# Patient Record
Sex: MALE | Race: WHITE | NOT HISPANIC OR LATINO
[De-identification: names, ages, dates, MRNs, and addresses within clinical notes are randomized per-mention and may not be internally consistent; named-entity substitution may affect disease eponyms.]

---

## 2017-01-23 ENCOUNTER — OTHER (OUTPATIENT)
Age: 72
End: 2017-01-23

## 2017-01-25 ENCOUNTER — APPOINTMENT (OUTPATIENT)
Dept: INTERNAL MEDICINE | Facility: CLINIC | Age: 72
End: 2017-01-25

## 2017-01-25 ENCOUNTER — LABORATORY RESULT (OUTPATIENT)
Age: 72
End: 2017-01-25

## 2017-01-29 LAB
25(OH)D3 SERPL-MCNC: 34.9 NG/ML
ALBUMIN SERPL ELPH-MCNC: 4 G/DL
ALP BLD-CCNC: 80 U/L
ALT SERPL-CCNC: 18 U/L
ANION GAP SERPL CALC-SCNC: 16 MMOL/L
AST SERPL-CCNC: 23 U/L
BASOPHILS # BLD AUTO: 0.03 K/UL
BASOPHILS NFR BLD AUTO: 0.3 %
BILIRUB SERPL-MCNC: 0.3 MG/DL
BUN SERPL-MCNC: 21 MG/DL
CALCIUM SERPL-MCNC: 10.1 MG/DL
CHLORIDE SERPL-SCNC: 101 MMOL/L
CHOLEST SERPL-MCNC: 104 MG/DL
CHOLEST/HDLC SERPL: 3.7 RATIO
CK SERPL-CCNC: 132 U/L
CO2 SERPL-SCNC: 26 MMOL/L
CREAT SERPL-MCNC: 1.47 MG/DL
EOSINOPHIL # BLD AUTO: 0.27 K/UL
EOSINOPHIL NFR BLD AUTO: 2.8 %
ERYTHROCYTE [SEDIMENTATION RATE] IN BLOOD BY WESTERGREN METHOD: 31 MM/HR
GGT SERPL-CCNC: 28 U/L
GLUCOSE SERPL-MCNC: 77 MG/DL
HBA1C MFR BLD HPLC: 6.4 %
HCT VFR BLD CALC: 44.3 %
HDLC SERPL-MCNC: 28 MG/DL
HGB BLD-MCNC: 15.3 G/DL
IMM GRANULOCYTES NFR BLD AUTO: 0.2 %
LDH SERPL-CCNC: 129 U/L
LDH1 CFR SERPL ELPH: 18 %
LDH1/LDH2 SERPL-CRTO: 0.53
LDH2 CFR SERPL ELPH: 34 %
LDH3 CFR SERPL ELPH: 22 %
LDH4 CFR SERPL ELPH: 9 %
LDH5 CFR SERPL ELPH: 17 %
LDLC SERPL CALC-MCNC: 45 MG/DL
LYMPHOCYTES # BLD AUTO: 2.19 K/UL
LYMPHOCYTES NFR BLD AUTO: 22.9 %
MAN DIFF?: NORMAL
MCHC RBC-ENTMCNC: 33.8 PG
MCHC RBC-ENTMCNC: 34.5 GM/DL
MCV RBC AUTO: 98 FL
MONOCYTES # BLD AUTO: 0.89 K/UL
MONOCYTES NFR BLD AUTO: 9.3 %
NEUTROPHILS # BLD AUTO: 6.16 K/UL
NEUTROPHILS NFR BLD AUTO: 64.5 %
PLATELET # BLD AUTO: 177 K/UL
POTASSIUM SERPL-SCNC: 3.6 MMOL/L
PROT SERPL-MCNC: 7.8 G/DL
PSA SERPL-MCNC: 0.57 NG/ML
RBC # BLD: 4.52 M/UL
RBC # FLD: 13.9 %
SAVE SPECIMEN: NORMAL
SODIUM SERPL-SCNC: 143 MMOL/L
T3RU NFR SERPL: 0.94 INDEX
T4 SERPL-MCNC: 6.2 UG/DL
TRIGL SERPL-MCNC: 154 MG/DL
TSH SERPL-ACNC: 3.37 UIU/ML
URATE SERPL-MCNC: 6.9 MG/DL
WBC # FLD AUTO: 9.56 K/UL

## 2017-02-08 ENCOUNTER — APPOINTMENT (OUTPATIENT)
Dept: INTERNAL MEDICINE | Facility: CLINIC | Age: 72
End: 2017-02-08

## 2017-02-08 ENCOUNTER — NON-APPOINTMENT (OUTPATIENT)
Age: 72
End: 2017-02-08

## 2017-02-08 VITALS — HEIGHT: 71 IN | WEIGHT: 194 LBS | BODY MASS INDEX: 27.16 KG/M2

## 2017-02-08 VITALS — DIASTOLIC BLOOD PRESSURE: 82 MMHG | SYSTOLIC BLOOD PRESSURE: 120 MMHG

## 2017-02-08 VITALS — SYSTOLIC BLOOD PRESSURE: 122 MMHG | DIASTOLIC BLOOD PRESSURE: 74 MMHG

## 2017-02-08 LAB
BILIRUB UR QL STRIP: NEGATIVE
CLARITY UR: CLEAR
COLLECTION METHOD: NORMAL
GLUCOSE UR-MCNC: NEGATIVE
HCG UR QL: 0.2 EU/DL
HGB UR QL STRIP.AUTO: NEGATIVE
KETONES UR-MCNC: NEGATIVE
LEUKOCYTE ESTERASE UR QL STRIP: NEGATIVE
NITRITE UR QL STRIP: NEGATIVE
PH UR STRIP: 7
PROT UR STRIP-MCNC: NEGATIVE
SP GR UR STRIP: 1.02

## 2017-05-31 ENCOUNTER — APPOINTMENT (OUTPATIENT)
Dept: NEUROLOGY | Facility: CLINIC | Age: 72
End: 2017-05-31

## 2017-05-31 VITALS
WEIGHT: 194 LBS | HEIGHT: 71 IN | BODY MASS INDEX: 27.16 KG/M2 | SYSTOLIC BLOOD PRESSURE: 160 MMHG | DIASTOLIC BLOOD PRESSURE: 81 MMHG | HEART RATE: 58 BPM

## 2017-06-07 ENCOUNTER — LABORATORY RESULT (OUTPATIENT)
Age: 72
End: 2017-06-07

## 2017-06-13 LAB
25(OH)D3 SERPL-MCNC: 35.2 NG/ML
ALBUMIN SERPL ELPH-MCNC: 4.4 G/DL
ALP BLD-CCNC: 83 U/L
ALT SERPL-CCNC: 14 U/L
ANION GAP SERPL CALC-SCNC: 11 MMOL/L
AST SERPL-CCNC: 21 U/L
BASOPHILS # BLD AUTO: 0.03 K/UL
BASOPHILS NFR BLD AUTO: 0.3 %
BILIRUB SERPL-MCNC: 0.5 MG/DL
BUN SERPL-MCNC: 21 MG/DL
CALCIUM SERPL-MCNC: 10.3 MG/DL
CHLORIDE SERPL-SCNC: 101 MMOL/L
CHOLEST SERPL-MCNC: 106 MG/DL
CHOLEST/HDLC SERPL: 3.1 RATIO
CK SERPL-CCNC: 134 U/L
CO2 SERPL-SCNC: 25 MMOL/L
CREAT SERPL-MCNC: 1.42 MG/DL
EOSINOPHIL # BLD AUTO: 0.21 K/UL
EOSINOPHIL NFR BLD AUTO: 1.8 %
ERYTHROCYTE [SEDIMENTATION RATE] IN BLOOD BY WESTERGREN METHOD: 37 MM/HR
FOLATE SERPL-MCNC: >20 NG/ML
GGT SERPL-CCNC: 33 U/L
GLUCOSE BS SERPL-MCNC: 96 MG/DL
HCT VFR BLD CALC: 43.5 %
HDLC SERPL-MCNC: 34 MG/DL
HGB BLD-MCNC: 15.1 G/DL
IMM GRANULOCYTES NFR BLD AUTO: 0.3 %
LDLC SERPL CALC-MCNC: 51 MG/DL
LYMPHOCYTES # BLD AUTO: 1.51 K/UL
LYMPHOCYTES NFR BLD AUTO: 13.1 %
MAN DIFF?: NORMAL
MCHC RBC-ENTMCNC: 33.7 PG
MCHC RBC-ENTMCNC: 34.7 GM/DL
MCV RBC AUTO: 97.1 FL
MONOCYTES # BLD AUTO: 1 K/UL
MONOCYTES NFR BLD AUTO: 8.7 %
NEUTROPHILS # BLD AUTO: 8.77 K/UL
NEUTROPHILS NFR BLD AUTO: 75.8 %
PANTOTHENATE SERPLBLD-MCNC: 41.2 NG/ML
PLATELET # BLD AUTO: 155 K/UL
POTASSIUM SERPL-SCNC: 3.6 MMOL/L
PROT SERPL-MCNC: 8.2 G/DL
RBC # BLD: 4.48 M/UL
RBC # FLD: 14.5 %
RPR SER QL: NORMAL
SODIUM SERPL-SCNC: 137 MMOL/L
T3RU NFR SERPL: 0.95 INDEX
T4 SERPL-MCNC: 7.2 UG/DL
TRIGL SERPL-MCNC: 106 MG/DL
TSH SERPL-ACNC: 2.25 UIU/ML
URATE SERPL-MCNC: 6.7 MG/DL
VIT B12 SERPL-MCNC: 665 PG/ML
VIT B2 SERPL-MCNC: 178 UG/L
VIT B6 SERPL-MCNC: 14.4 UG/L
WBC # FLD AUTO: 11.55 K/UL

## 2017-06-21 ENCOUNTER — MEDICATION RENEWAL (OUTPATIENT)
Age: 72
End: 2017-06-21

## 2017-06-21 ENCOUNTER — APPOINTMENT (OUTPATIENT)
Dept: INTERNAL MEDICINE | Facility: CLINIC | Age: 72
End: 2017-06-21

## 2017-06-21 ENCOUNTER — NON-APPOINTMENT (OUTPATIENT)
Age: 72
End: 2017-06-21

## 2017-06-21 VITALS — DIASTOLIC BLOOD PRESSURE: 72 MMHG | SYSTOLIC BLOOD PRESSURE: 120 MMHG

## 2017-06-21 VITALS — WEIGHT: 196 LBS | HEIGHT: 71 IN | BODY MASS INDEX: 27.44 KG/M2

## 2017-06-21 DIAGNOSIS — R05 COUGH: ICD-10-CM

## 2017-06-21 LAB
BASOPHILS # BLD AUTO: 0.03 K/UL
BASOPHILS NFR BLD AUTO: 0.2 %
EOSINOPHIL # BLD AUTO: 0.24 K/UL
EOSINOPHIL NFR BLD AUTO: 1.7 %
HCT VFR BLD CALC: 42 %
HGB BLD-MCNC: 14.4 G/DL
IMM GRANULOCYTES NFR BLD AUTO: 0.3 %
LYMPHOCYTES # BLD AUTO: 2.06 K/UL
LYMPHOCYTES NFR BLD AUTO: 14.2 %
MAN DIFF?: NORMAL
MCHC RBC-ENTMCNC: 32.7 PG
MCHC RBC-ENTMCNC: 34.3 GM/DL
MCV RBC AUTO: 95.5 FL
MONOCYTES # BLD AUTO: 1.03 K/UL
MONOCYTES NFR BLD AUTO: 7.1 %
NEUTROPHILS # BLD AUTO: 11.08 K/UL
NEUTROPHILS NFR BLD AUTO: 76.5 %
PLATELET # BLD AUTO: 221 K/UL
RBC # BLD: 4.4 M/UL
RBC # FLD: 14.3 %
WBC # FLD AUTO: 14.49 K/UL

## 2017-07-05 ENCOUNTER — APPOINTMENT (OUTPATIENT)
Dept: INTERNAL MEDICINE | Facility: CLINIC | Age: 72
End: 2017-07-05

## 2017-07-05 ENCOUNTER — LABORATORY RESULT (OUTPATIENT)
Age: 72
End: 2017-07-05

## 2017-07-06 LAB
ALBUMIN SERPL ELPH-MCNC: 4 G/DL
ALP BLD-CCNC: 77 U/L
ALT SERPL-CCNC: 13 U/L
ANION GAP SERPL CALC-SCNC: 14 MMOL/L
AST SERPL-CCNC: 16 U/L
BASOPHILS # BLD AUTO: 0.02 K/UL
BASOPHILS NFR BLD AUTO: 0.3 %
BILIRUB SERPL-MCNC: 0.4 MG/DL
BUN SERPL-MCNC: 30 MG/DL
CALCIUM SERPL-MCNC: 10.2 MG/DL
CHLORIDE SERPL-SCNC: 98 MMOL/L
CO2 SERPL-SCNC: 27 MMOL/L
CREAT SERPL-MCNC: 1.72 MG/DL
EOSINOPHIL # BLD AUTO: 0.2 K/UL
EOSINOPHIL NFR BLD AUTO: 2.5 %
GGT SERPL-CCNC: 25 U/L
GLUCOSE SERPL-MCNC: 115 MG/DL
HCT VFR BLD CALC: 42.6 %
HGB BLD-MCNC: 14.7 G/DL
IMM GRANULOCYTES NFR BLD AUTO: 0.4 %
LYMPHOCYTES # BLD AUTO: 1.7 K/UL
LYMPHOCYTES NFR BLD AUTO: 21.4 %
MAN DIFF?: NORMAL
MCHC RBC-ENTMCNC: 33.6 PG
MCHC RBC-ENTMCNC: 34.5 GM/DL
MCV RBC AUTO: 97.5 FL
MONOCYTES # BLD AUTO: 0.8 K/UL
MONOCYTES NFR BLD AUTO: 10.1 %
NEUTROPHILS # BLD AUTO: 5.19 K/UL
NEUTROPHILS NFR BLD AUTO: 65.3 %
PLATELET # BLD AUTO: 176 K/UL
POTASSIUM SERPL-SCNC: 4.2 MMOL/L
PROT SERPL-MCNC: 7.5 G/DL
RBC # BLD: 4.37 M/UL
RBC # FLD: 14.5 %
SODIUM SERPL-SCNC: 139 MMOL/L
WBC # FLD AUTO: 7.94 K/UL

## 2017-07-19 ENCOUNTER — LABORATORY RESULT (OUTPATIENT)
Age: 72
End: 2017-07-19

## 2017-07-19 ENCOUNTER — APPOINTMENT (OUTPATIENT)
Dept: INTERNAL MEDICINE | Facility: CLINIC | Age: 72
End: 2017-07-19

## 2017-09-27 ENCOUNTER — LABORATORY RESULT (OUTPATIENT)
Age: 72
End: 2017-09-27

## 2017-09-27 ENCOUNTER — APPOINTMENT (OUTPATIENT)
Dept: INTERNAL MEDICINE | Facility: CLINIC | Age: 72
End: 2017-09-27
Payer: MEDICARE

## 2017-09-27 PROCEDURE — 36415 COLL VENOUS BLD VENIPUNCTURE: CPT

## 2017-09-28 LAB
25(OH)D3 SERPL-MCNC: 33.7 NG/ML
ALBUMIN SERPL ELPH-MCNC: 4 G/DL
ALP BLD-CCNC: 70 U/L
ALT SERPL-CCNC: 14 U/L
ANION GAP SERPL CALC-SCNC: 14 MMOL/L
AST SERPL-CCNC: 21 U/L
BASOPHILS # BLD AUTO: 0.03 K/UL
BASOPHILS NFR BLD AUTO: 0.3 %
BILIRUB SERPL-MCNC: 0.5 MG/DL
BUN SERPL-MCNC: 20 MG/DL
CALCIUM SERPL-MCNC: 9.7 MG/DL
CHLORIDE SERPL-SCNC: 100 MMOL/L
CHOLEST SERPL-MCNC: 105 MG/DL
CHOLEST/HDLC SERPL: 3.8 RATIO
CK SERPL-CCNC: 156 U/L
CO2 SERPL-SCNC: 25 MMOL/L
CREAT SERPL-MCNC: 1.36 MG/DL
EOSINOPHIL # BLD AUTO: 0.27 K/UL
EOSINOPHIL NFR BLD AUTO: 3 %
GGT SERPL-CCNC: 28 U/L
GLUCOSE SERPL-MCNC: 89 MG/DL
HBA1C MFR BLD HPLC: 6 %
HCT VFR BLD CALC: 42.5 %
HDLC SERPL-MCNC: 28 MG/DL
HGB BLD-MCNC: 14.7 G/DL
IMM GRANULOCYTES NFR BLD AUTO: 0.3 %
LDLC SERPL CALC-MCNC: 44 MG/DL
LYMPHOCYTES # BLD AUTO: 2.42 K/UL
LYMPHOCYTES NFR BLD AUTO: 27.2 %
MAN DIFF?: NORMAL
MCHC RBC-ENTMCNC: 33.9 PG
MCHC RBC-ENTMCNC: 34.6 GM/DL
MCV RBC AUTO: 98.2 FL
MONOCYTES # BLD AUTO: 0.8 K/UL
MONOCYTES NFR BLD AUTO: 9 %
NEUTROPHILS # BLD AUTO: 5.35 K/UL
NEUTROPHILS NFR BLD AUTO: 60.2 %
PLATELET # BLD AUTO: 170 K/UL
POTASSIUM SERPL-SCNC: 3.6 MMOL/L
PROT SERPL-MCNC: 7.3 G/DL
PSA FREE FLD-MCNC: 42.9
PSA FREE SERPL-MCNC: 0.24 NG/ML
PSA SERPL-MCNC: 0.56 NG/ML
RBC # BLD: 4.33 M/UL
RBC # FLD: 14.9 %
SAVE SPECIMEN: NORMAL
SODIUM SERPL-SCNC: 139 MMOL/L
T3RU NFR SERPL: 0.95 INDEX
T4 SERPL-MCNC: 6.5 UG/DL
TRIGL SERPL-MCNC: 165 MG/DL
TSH SERPL-ACNC: 2.51 UIU/ML
URATE SERPL-MCNC: 7.1 MG/DL
WBC # FLD AUTO: 8.9 K/UL

## 2017-10-02 ENCOUNTER — NON-APPOINTMENT (OUTPATIENT)
Age: 72
End: 2017-10-02

## 2017-10-02 ENCOUNTER — APPOINTMENT (OUTPATIENT)
Dept: INTERNAL MEDICINE | Facility: CLINIC | Age: 72
End: 2017-10-02
Payer: MEDICARE

## 2017-10-02 VITALS — HEIGHT: 71 IN | BODY MASS INDEX: 44.1 KG/M2 | WEIGHT: 315 LBS

## 2017-10-02 VITALS — SYSTOLIC BLOOD PRESSURE: 138 MMHG | DIASTOLIC BLOOD PRESSURE: 80 MMHG

## 2017-10-02 VITALS — DIASTOLIC BLOOD PRESSURE: 86 MMHG | SYSTOLIC BLOOD PRESSURE: 142 MMHG

## 2017-10-02 LAB
BILIRUB UR QL STRIP: NORMAL
CLARITY UR: CLEAR
COLLECTION METHOD: NORMAL
GLUCOSE UR-MCNC: NORMAL
HCG UR QL: 0.2 EU/DL
HGB UR QL STRIP.AUTO: NORMAL
KETONES UR-MCNC: NORMAL
LEUKOCYTE ESTERASE UR QL STRIP: NORMAL
NITRITE UR QL STRIP: NORMAL
PH UR STRIP: 6.5
PROT UR STRIP-MCNC: NORMAL
SP GR UR STRIP: 1.02

## 2017-10-02 PROCEDURE — 90686 IIV4 VACC NO PRSV 0.5 ML IM: CPT

## 2017-10-02 PROCEDURE — 99214 OFFICE O/P EST MOD 30 MIN: CPT | Mod: 25

## 2017-10-02 PROCEDURE — G0008: CPT

## 2017-10-02 PROCEDURE — 93000 ELECTROCARDIOGRAM COMPLETE: CPT

## 2017-10-02 PROCEDURE — 81003 URINALYSIS AUTO W/O SCOPE: CPT | Mod: QW

## 2018-01-03 ENCOUNTER — LABORATORY RESULT (OUTPATIENT)
Age: 73
End: 2018-01-03

## 2018-01-03 ENCOUNTER — NON-APPOINTMENT (OUTPATIENT)
Age: 73
End: 2018-01-03

## 2018-01-03 ENCOUNTER — APPOINTMENT (OUTPATIENT)
Dept: INTERNAL MEDICINE | Facility: CLINIC | Age: 73
End: 2018-01-03
Payer: MEDICARE

## 2018-01-03 VITALS — HEIGHT: 71 IN | WEIGHT: 195 LBS | BODY MASS INDEX: 27.3 KG/M2

## 2018-01-03 VITALS — DIASTOLIC BLOOD PRESSURE: 82 MMHG | SYSTOLIC BLOOD PRESSURE: 138 MMHG

## 2018-01-03 PROCEDURE — 36415 COLL VENOUS BLD VENIPUNCTURE: CPT

## 2018-01-03 PROCEDURE — 81003 URINALYSIS AUTO W/O SCOPE: CPT | Mod: QW

## 2018-01-03 PROCEDURE — 93000 ELECTROCARDIOGRAM COMPLETE: CPT

## 2018-01-03 PROCEDURE — 99214 OFFICE O/P EST MOD 30 MIN: CPT | Mod: 25

## 2018-01-03 RX ORDER — VENLAFAXINE HYDROCHLORIDE 75 MG/1
75 CAPSULE, EXTENDED RELEASE ORAL DAILY
Qty: 90 | Refills: 3 | Status: DISCONTINUED | COMMUNITY
Start: 2017-10-02 | End: 2018-01-03

## 2018-01-04 LAB
25(OH)D3 SERPL-MCNC: 26.7 NG/ML
ALBUMIN SERPL ELPH-MCNC: 4 G/DL
ALP BLD-CCNC: 77 U/L
ALT SERPL-CCNC: 18 U/L
ANION GAP SERPL CALC-SCNC: 12 MMOL/L
AST SERPL-CCNC: 24 U/L
BASOPHILS # BLD AUTO: 0.05 K/UL
BASOPHILS NFR BLD AUTO: 0.5 %
BILIRUB SERPL-MCNC: 0.4 MG/DL
BUN SERPL-MCNC: 22 MG/DL
CALCIUM SERPL-MCNC: 10.2 MG/DL
CHLORIDE SERPL-SCNC: 98 MMOL/L
CHOLEST SERPL-MCNC: 107 MG/DL
CHOLEST/HDLC SERPL: 4 RATIO
CK SERPL-CCNC: 129 U/L
CO2 SERPL-SCNC: 28 MMOL/L
CREAT SERPL-MCNC: 1.5 MG/DL
EOSINOPHIL # BLD AUTO: 0.32 K/UL
EOSINOPHIL NFR BLD AUTO: 3.1 %
GGT SERPL-CCNC: 30 U/L
GLUCOSE SERPL-MCNC: 65 MG/DL
HBA1C MFR BLD HPLC: 6.2 %
HCT VFR BLD CALC: 43.8 %
HDLC SERPL-MCNC: 27 MG/DL
HGB BLD-MCNC: 15 G/DL
IMM GRANULOCYTES NFR BLD AUTO: 0.3 %
LDLC SERPL CALC-MCNC: 42 MG/DL
LYMPHOCYTES # BLD AUTO: 3.22 K/UL
LYMPHOCYTES NFR BLD AUTO: 30.8 %
MAN DIFF?: NORMAL
MCHC RBC-ENTMCNC: 33.8 PG
MCHC RBC-ENTMCNC: 34.2 GM/DL
MCV RBC AUTO: 98.6 FL
MONOCYTES # BLD AUTO: 0.88 K/UL
MONOCYTES NFR BLD AUTO: 8.4 %
NEUTROPHILS # BLD AUTO: 5.96 K/UL
NEUTROPHILS NFR BLD AUTO: 56.9 %
PLATELET # BLD AUTO: 161 K/UL
POTASSIUM SERPL-SCNC: 3.8 MMOL/L
PROT SERPL-MCNC: 7.5 G/DL
PSA FREE FLD-MCNC: 47.5
PSA FREE SERPL-MCNC: 0.28 NG/ML
PSA SERPL-MCNC: 0.59 NG/ML
RBC # BLD: 4.44 M/UL
RBC # FLD: 14.9 %
SAVE SPECIMEN: NORMAL
SODIUM SERPL-SCNC: 138 MMOL/L
T3RU NFR SERPL: 0.99 INDEX
T4 SERPL-MCNC: 6.2 UG/DL
TRIGL SERPL-MCNC: 190 MG/DL
TSH SERPL-ACNC: 3.21 UIU/ML
URATE SERPL-MCNC: 6.7 MG/DL
WBC # FLD AUTO: 10.46 K/UL

## 2018-01-05 LAB
HCV AB SER QL: NONREACTIVE
HCV S/CO RATIO: 0.19 S/CO

## 2018-01-16 ENCOUNTER — MESSAGE (OUTPATIENT)
Age: 73
End: 2018-01-16

## 2018-01-16 ENCOUNTER — APPOINTMENT (OUTPATIENT)
Dept: GASTROENTEROLOGY | Facility: CLINIC | Age: 73
End: 2018-01-16
Payer: MEDICARE

## 2018-01-16 VITALS
WEIGHT: 198 LBS | SYSTOLIC BLOOD PRESSURE: 136 MMHG | DIASTOLIC BLOOD PRESSURE: 80 MMHG | OXYGEN SATURATION: 97 % | HEART RATE: 58 BPM | BODY MASS INDEX: 27.62 KG/M2

## 2018-01-16 PROCEDURE — 99203 OFFICE O/P NEW LOW 30 MIN: CPT

## 2018-01-16 RX ORDER — SODIUM PICOSULFATE, MAGNESIUM OXIDE, AND ANHYDROUS CITRIC ACID 10; 3.5; 12 MG/16.2G; G/16.2G; G/16.2G
10-3.5-12 POWDER, METERED ORAL
Qty: 1 | Refills: 0 | Status: DISCONTINUED | COMMUNITY
Start: 2018-01-16 | End: 2018-01-16

## 2018-03-29 ENCOUNTER — APPOINTMENT (OUTPATIENT)
Dept: GASTROENTEROLOGY | Facility: HOSPITAL | Age: 73
End: 2018-03-29

## 2018-04-05 ENCOUNTER — APPOINTMENT (OUTPATIENT)
Dept: GASTROENTEROLOGY | Facility: HOSPITAL | Age: 73
End: 2018-04-05

## 2018-04-05 ENCOUNTER — RESULT REVIEW (OUTPATIENT)
Age: 73
End: 2018-04-05

## 2018-04-05 ENCOUNTER — OUTPATIENT (OUTPATIENT)
Dept: OUTPATIENT SERVICES | Facility: HOSPITAL | Age: 73
LOS: 1 days | Discharge: HOME | End: 2018-04-05
Payer: MEDICARE

## 2018-04-05 DIAGNOSIS — Z12.11 ENCOUNTER FOR SCREENING FOR MALIGNANT NEOPLASM OF COLON: ICD-10-CM

## 2018-04-05 PROCEDURE — 45380 COLONOSCOPY AND BIOPSY: CPT | Mod: 59,PT

## 2018-04-05 PROCEDURE — 88305 TISSUE EXAM BY PATHOLOGIST: CPT | Mod: 26

## 2018-04-05 PROCEDURE — 45385 COLONOSCOPY W/LESION REMOVAL: CPT | Mod: PT

## 2018-04-09 LAB — SURGICAL PATHOLOGY STUDY: SIGNIFICANT CHANGE UP

## 2018-05-30 ENCOUNTER — LABORATORY RESULT (OUTPATIENT)
Age: 73
End: 2018-05-30

## 2018-05-30 ENCOUNTER — NON-APPOINTMENT (OUTPATIENT)
Age: 73
End: 2018-05-30

## 2018-05-30 ENCOUNTER — APPOINTMENT (OUTPATIENT)
Dept: INTERNAL MEDICINE | Facility: CLINIC | Age: 73
End: 2018-05-30
Payer: MEDICARE

## 2018-05-30 VITALS
SYSTOLIC BLOOD PRESSURE: 128 MMHG | HEIGHT: 70.5 IN | BODY MASS INDEX: 27.75 KG/M2 | WEIGHT: 196 LBS | DIASTOLIC BLOOD PRESSURE: 84 MMHG

## 2018-05-30 VITALS — SYSTOLIC BLOOD PRESSURE: 130 MMHG | DIASTOLIC BLOOD PRESSURE: 86 MMHG

## 2018-05-30 LAB
BILIRUB UR QL STRIP: NORMAL
CLARITY UR: CLEAR
COLLECTION METHOD: NORMAL
GLUCOSE UR-MCNC: NORMAL
HCG UR QL: 0.2 EU/DL
HGB UR QL STRIP.AUTO: NORMAL
KETONES UR-MCNC: NORMAL
LEUKOCYTE ESTERASE UR QL STRIP: NORMAL
NITRITE UR QL STRIP: NORMAL
PH UR STRIP: 6
PROT UR STRIP-MCNC: NORMAL
SAVE SPECIMEN: NORMAL
SP GR UR STRIP: 1.02

## 2018-05-30 PROCEDURE — 81003 URINALYSIS AUTO W/O SCOPE: CPT | Mod: QW

## 2018-05-30 PROCEDURE — 99214 OFFICE O/P EST MOD 30 MIN: CPT

## 2018-05-30 PROCEDURE — 93000 ELECTROCARDIOGRAM COMPLETE: CPT

## 2018-05-30 RX ORDER — SODIUM SULFATE, POTASSIUM SULFATE, MAGNESIUM SULFATE 17.5; 3.13; 1.6 G/ML; G/ML; G/ML
17.5-3.13-1.6 SOLUTION, CONCENTRATE ORAL
Qty: 1 | Refills: 0 | Status: DISCONTINUED | COMMUNITY
Start: 2018-01-16 | End: 2018-05-30

## 2018-05-30 NOTE — ASSESSMENT
[FreeTextEntry1] : This is a complex 72-year-old male whose history has been reviewed above\par \par He has a history of hypertension his blood pressure is under excellent control no medication changes\par \par His history of recurrent fibrillation he has had no recurrent episodes he is followed by cardiology EKG is unchanged\par \par He has a history of ASHD and hypercholesterolemia and is on a statin cholesterol" medication changes particularly results\par \par He continues to demonstrate somnolence he fell asleep twice in my office. He is quite belligerent when I pointed this out and has refused a sleep study.\par \par He does have some elements of mild memory impairment and he continues to be belligerent. I did prescribe Lexapro which he refused however his wife has been giving him 5 mg pills. I told her that I cannot prescribe medication for him but he does not know about. I told her that she must tell him what she is doing. At this point I cannot avulsion to him without breaking the confidentiality of her patient-doctor relationship but that I would not continue to prescribe it if he was not aware

## 2018-05-30 NOTE — HISTORY OF PRESENT ILLNESS
[FreeTextEntry1] : This is a 73-year-old male for evaluation and treatment of his hypertension hypercholesterolemia history of atrial fibrillation ASHD and gout [de-identified] : Patient states that he is feeling well he has had no chest pain shortness of breath. He has had no episodes of atrial fibrillation.\par \par He is complaining of some mild upper respiratory tract symptoms

## 2018-05-30 NOTE — PHYSICAL EXAM
[No Acute Distress] : no acute distress [Well Nourished] : well nourished [Well Developed] : well developed [Normal Sclera/Conjunctiva] : normal sclera/conjunctiva [PERRL] : pupils equal round and reactive to light [No JVD] : no jugular venous distention [No Respiratory Distress] : no respiratory distress  [Normal Rate] : normal rate  [Regular Rhythm] : with a regular rhythm [Normal S1, S2] : normal S1 and S2 [No Edema] : there was no peripheral edema [No Joint Swelling] : no joint swelling [No Focal Deficits] : no focal deficits [de-identified] : Occasional rales that clear with cough [de-identified] : Lethargic [de-identified] : Basically unchanged

## 2018-05-31 LAB
25(OH)D3 SERPL-MCNC: 30.6 NG/ML
ALBUMIN SERPL ELPH-MCNC: 4.1 G/DL
ALP BLD-CCNC: 70 U/L
ALT SERPL-CCNC: 11 U/L
ANION GAP SERPL CALC-SCNC: 13 MMOL/L
AST SERPL-CCNC: 16 U/L
BASOPHILS # BLD AUTO: 0.02 K/UL
BASOPHILS NFR BLD AUTO: 0.2 %
BILIRUB SERPL-MCNC: 0.6 MG/DL
BUN SERPL-MCNC: 24 MG/DL
CALCIUM SERPL-MCNC: 10 MG/DL
CHLORIDE SERPL-SCNC: 98 MMOL/L
CHOLEST SERPL-MCNC: 101 MG/DL
CHOLEST/HDLC SERPL: 3.4 RATIO
CK SERPL-CCNC: 113 U/L
CO2 SERPL-SCNC: 25 MMOL/L
CREAT SERPL-MCNC: 1.49 MG/DL
CRP SERPL HS-MCNC: 1.4 MG/L
EOSINOPHIL # BLD AUTO: 0.26 K/UL
EOSINOPHIL NFR BLD AUTO: 2.4 %
GGT SERPL-CCNC: 32 U/L
GLUCOSE SERPL-MCNC: 87 MG/DL
HBA1C MFR BLD HPLC: 6.1 %
HCT VFR BLD CALC: 45 %
HDLC SERPL-MCNC: 30 MG/DL
HGB BLD-MCNC: 15.1 G/DL
IMM GRANULOCYTES NFR BLD AUTO: 0.4 %
LDLC SERPL CALC-MCNC: 44 MG/DL
LYMPHOCYTES # BLD AUTO: 2.74 K/UL
LYMPHOCYTES NFR BLD AUTO: 24.8 %
MAN DIFF?: NORMAL
MCHC RBC-ENTMCNC: 32.9 PG
MCHC RBC-ENTMCNC: 33.6 GM/DL
MCV RBC AUTO: 98 FL
MONOCYTES # BLD AUTO: 0.86 K/UL
MONOCYTES NFR BLD AUTO: 7.8 %
NEUTROPHILS # BLD AUTO: 7.12 K/UL
NEUTROPHILS NFR BLD AUTO: 64.4 %
PLATELET # BLD AUTO: 172 K/UL
POTASSIUM SERPL-SCNC: 3.7 MMOL/L
PROT SERPL-MCNC: 7.6 G/DL
RBC # BLD: 4.59 M/UL
RBC # FLD: 15 %
SODIUM SERPL-SCNC: 136 MMOL/L
T3RU NFR SERPL: 0.89 INDEX
T4 SERPL-MCNC: 7.2 UG/DL
TRIGL SERPL-MCNC: 135 MG/DL
TSH SERPL-ACNC: 1.87 UIU/ML
URATE SERPL-MCNC: 6.6 MG/DL
WBC # FLD AUTO: 11.04 K/UL

## 2018-08-03 ENCOUNTER — MESSAGE (OUTPATIENT)
Age: 73
End: 2018-08-03

## 2018-08-05 ENCOUNTER — RESULT CHARGE (OUTPATIENT)
Age: 73
End: 2018-08-05

## 2018-08-06 ENCOUNTER — NON-APPOINTMENT (OUTPATIENT)
Age: 73
End: 2018-08-06

## 2018-08-06 ENCOUNTER — APPOINTMENT (OUTPATIENT)
Dept: INTERNAL MEDICINE | Facility: CLINIC | Age: 73
End: 2018-08-06
Payer: MEDICARE

## 2018-08-06 VITALS — DIASTOLIC BLOOD PRESSURE: 70 MMHG | SYSTOLIC BLOOD PRESSURE: 130 MMHG

## 2018-08-06 DIAGNOSIS — M79.604 PAIN IN RIGHT LEG: ICD-10-CM

## 2018-08-06 DIAGNOSIS — M54.31 SCIATICA, RIGHT SIDE: ICD-10-CM

## 2018-08-06 LAB
ALBUMIN SERPL ELPH-MCNC: 4.1 G/DL
ALP BLD-CCNC: 64 U/L
ALT SERPL-CCNC: 16 U/L
ANION GAP SERPL CALC-SCNC: 14 MMOL/L
AST SERPL-CCNC: 21 U/L
BASOPHILS # BLD AUTO: 0.02 K/UL
BASOPHILS NFR BLD AUTO: 0.2 %
BILIRUB SERPL-MCNC: 0.5 MG/DL
BILIRUB UR QL STRIP: NORMAL
BUN SERPL-MCNC: 22 MG/DL
CALCIUM SERPL-MCNC: 10.5 MG/DL
CHLORIDE SERPL-SCNC: 97 MMOL/L
CHOLEST SERPL-MCNC: 100 MG/DL
CHOLEST/HDLC SERPL: 2.9 RATIO
CK SERPL-CCNC: 133 U/L
CLARITY UR: CLEAR
CO2 SERPL-SCNC: 28 MMOL/L
COLLECTION METHOD: NORMAL
CREAT SERPL-MCNC: 1.5 MG/DL
EOSINOPHIL # BLD AUTO: 0.33 K/UL
EOSINOPHIL NFR BLD AUTO: 3 %
GGT SERPL-CCNC: 26 U/L
GLUCOSE SERPL-MCNC: 92 MG/DL
GLUCOSE UR-MCNC: NORMAL
HBA1C MFR BLD HPLC: 6.2 %
HCG UR QL: 0.2 EU/DL
HCT VFR BLD CALC: 44.5 %
HDLC SERPL-MCNC: 35 MG/DL
HGB BLD-MCNC: 15.6 G/DL
HGB UR QL STRIP.AUTO: NORMAL
IMM GRANULOCYTES NFR BLD AUTO: 0.4 %
KETONES UR-MCNC: NORMAL
LDLC SERPL CALC-MCNC: 44 MG/DL
LEUKOCYTE ESTERASE UR QL STRIP: NORMAL
LYMPHOCYTES # BLD AUTO: 2.18 K/UL
LYMPHOCYTES NFR BLD AUTO: 19.6 %
MAN DIFF?: NORMAL
MCHC RBC-ENTMCNC: 34 PG
MCHC RBC-ENTMCNC: 35.1 GM/DL
MCV RBC AUTO: 96.9 FL
MONOCYTES # BLD AUTO: 1.01 K/UL
MONOCYTES NFR BLD AUTO: 9.1 %
NEUTROPHILS # BLD AUTO: 7.52 K/UL
NEUTROPHILS NFR BLD AUTO: 67.7 %
NITRITE UR QL STRIP: NORMAL
PH UR STRIP: 7
PLATELET # BLD AUTO: 154 K/UL
POTASSIUM SERPL-SCNC: 4.1 MMOL/L
PROT SERPL-MCNC: 7.3 G/DL
PROT UR STRIP-MCNC: NORMAL
RBC # BLD: 4.59 M/UL
RBC # FLD: 14.8 %
SAVE SPECIMEN: NORMAL
SODIUM SERPL-SCNC: 138 MMOL/L
SP GR UR STRIP: 1.01
TRIGL SERPL-MCNC: 105 MG/DL
URATE SERPL-MCNC: 6.4 MG/DL
WBC # FLD AUTO: 11.1 K/UL

## 2018-08-06 PROCEDURE — 99215 OFFICE O/P EST HI 40 MIN: CPT | Mod: 25

## 2018-08-06 PROCEDURE — 36415 COLL VENOUS BLD VENIPUNCTURE: CPT

## 2018-08-06 PROCEDURE — 93000 ELECTROCARDIOGRAM COMPLETE: CPT

## 2018-08-06 PROCEDURE — 81003 URINALYSIS AUTO W/O SCOPE: CPT | Mod: QW

## 2018-08-06 NOTE — PHYSICAL EXAM
[No JVD] : no jugular venous distention [Supple] : supple [No Respiratory Distress] : no respiratory distress  [Clear to Auscultation] : lungs were clear to auscultation bilaterally [No Accessory Muscle Use] : no accessory muscle use [Normal Rate] : normal rate  [Regular Rhythm] : with a regular rhythm [Normal S1, S2] : normal S1 and S2 [de-identified] : Weakness of right leg

## 2018-08-06 NOTE — ASSESSMENT
[FreeTextEntry1] : The patient's vital signs were stable. His physical examination with a detailed neurological examination was normal except for mild decreased reflexes of his right leg and definite muscle weakness of his right leg. My impression is that he has compression phenomenon of the lumbosacral spine specifically between L4 and L5. I ordered an MRI of the lumbosacral spine and blood were obtained.

## 2018-08-06 NOTE — HISTORY OF PRESENT ILLNESS
[de-identified] : This is a patient with a history of hypertension, hypercholesterolemia who presents today with severe low back pain with radiation to the lateral aspect of his right leg. This is associated with some bald weakness of the leg also. He denies any fever any chills he recently was on long car trip when he developed this

## 2018-08-06 NOTE — REVIEW OF SYSTEMS
[Fatigue] : fatigue [Muscle Pain] : muscle pain [Muscle Weakness] : muscle weakness [Back Pain] : back pain [Negative] : Respiratory [FreeTextEntry9] : Muscle weakness right leg

## 2018-08-07 LAB — 25(OH)D3 SERPL-MCNC: 35.1 NG/ML

## 2018-09-07 ENCOUNTER — RX RENEWAL (OUTPATIENT)
Age: 73
End: 2018-09-07

## 2018-09-10 ENCOUNTER — RX RENEWAL (OUTPATIENT)
Age: 73
End: 2018-09-10

## 2018-10-17 RX ORDER — METHYLPREDNISOLONE 4 MG/1
4 TABLET ORAL
Qty: 1 | Refills: 1 | Status: DISCONTINUED | COMMUNITY
Start: 2018-08-06 | End: 2018-10-17

## 2018-11-07 ENCOUNTER — LABORATORY RESULT (OUTPATIENT)
Age: 73
End: 2018-11-07

## 2018-11-07 ENCOUNTER — APPOINTMENT (OUTPATIENT)
Dept: INTERNAL MEDICINE | Facility: CLINIC | Age: 73
End: 2018-11-07
Payer: MEDICARE

## 2018-11-07 PROCEDURE — 36415 COLL VENOUS BLD VENIPUNCTURE: CPT

## 2018-11-13 ENCOUNTER — APPOINTMENT (OUTPATIENT)
Dept: INTERNAL MEDICINE | Facility: CLINIC | Age: 73
End: 2018-11-13
Payer: MEDICARE

## 2018-11-13 ENCOUNTER — NON-APPOINTMENT (OUTPATIENT)
Age: 73
End: 2018-11-13

## 2018-11-13 VITALS — DIASTOLIC BLOOD PRESSURE: 72 MMHG | SYSTOLIC BLOOD PRESSURE: 118 MMHG

## 2018-11-13 VITALS — DIASTOLIC BLOOD PRESSURE: 78 MMHG | SYSTOLIC BLOOD PRESSURE: 120 MMHG

## 2018-11-13 VITALS
HEIGHT: 70 IN | DIASTOLIC BLOOD PRESSURE: 70 MMHG | BODY MASS INDEX: 27.63 KG/M2 | SYSTOLIC BLOOD PRESSURE: 120 MMHG | WEIGHT: 193 LBS

## 2018-11-13 LAB
25(OH)D3 SERPL-MCNC: 34.4 NG/ML
ALBUMIN SERPL ELPH-MCNC: 4.2 G/DL
ALP BLD-CCNC: 68 U/L
ALT SERPL-CCNC: 14 U/L
ANION GAP SERPL CALC-SCNC: 12 MMOL/L
AST SERPL-CCNC: 17 U/L
BASOPHILS # BLD AUTO: 0.02 K/UL
BASOPHILS NFR BLD AUTO: 0.2 %
BILIRUB SERPL-MCNC: 0.5 MG/DL
BILIRUB UR QL STRIP: NORMAL
BUN SERPL-MCNC: 23 MG/DL
CALCIUM SERPL-MCNC: 10 MG/DL
CHLORIDE SERPL-SCNC: 100 MMOL/L
CHOLEST SERPL-MCNC: 105 MG/DL
CHOLEST/HDLC SERPL: 3.2 RATIO
CK SERPL-CCNC: 101 U/L
CLARITY UR: CLEAR
CO2 SERPL-SCNC: 27 MMOL/L
COLLECTION METHOD: NORMAL
CREAT SERPL-MCNC: 1.65 MG/DL
EOSINOPHIL # BLD AUTO: 0.21 K/UL
EOSINOPHIL NFR BLD AUTO: 2.2 %
GGT SERPL-CCNC: 28 U/L
GLUCOSE SERPL-MCNC: 117 MG/DL
GLUCOSE UR-MCNC: NORMAL
HBA1C MFR BLD HPLC: 6.3 %
HCG UR QL: 0.2 EU/DL
HCT VFR BLD CALC: 41.5 %
HDLC SERPL-MCNC: 33 MG/DL
HGB BLD-MCNC: 14.5 G/DL
HGB UR QL STRIP.AUTO: NORMAL
IMM GRANULOCYTES NFR BLD AUTO: 0.3 %
KETONES UR-MCNC: NORMAL
LDLC SERPL CALC-MCNC: 50 MG/DL
LEUKOCYTE ESTERASE UR QL STRIP: NORMAL
LYMPHOCYTES # BLD AUTO: 2.08 K/UL
LYMPHOCYTES NFR BLD AUTO: 22 %
MAN DIFF?: NORMAL
MCHC RBC-ENTMCNC: 33.6 PG
MCHC RBC-ENTMCNC: 34.9 GM/DL
MCV RBC AUTO: 96.1 FL
MONOCYTES # BLD AUTO: 0.79 K/UL
MONOCYTES NFR BLD AUTO: 8.3 %
NEUTROPHILS # BLD AUTO: 6.34 K/UL
NEUTROPHILS NFR BLD AUTO: 67 %
NITRITE UR QL STRIP: NORMAL
PH UR STRIP: 7
PLATELET # BLD AUTO: 160 K/UL
POTASSIUM SERPL-SCNC: 3.5 MMOL/L
PROT SERPL-MCNC: 7 G/DL
PROT UR STRIP-MCNC: NORMAL
PSA FREE FLD-MCNC: 36.4
PSA FREE SERPL-MCNC: 0.32 NG/ML
PSA SERPL-MCNC: 0.88 NG/ML
RBC # BLD: 4.32 M/UL
RBC # FLD: 14.7 %
SODIUM SERPL-SCNC: 139 MMOL/L
SP GR UR STRIP: 1.01
T3RU NFR SERPL: 0.93 INDEX
T4 SERPL-MCNC: 6.4 UG/DL
TRIGL SERPL-MCNC: 109 MG/DL
TSH SERPL-ACNC: 1.76 UIU/ML
URATE SERPL-MCNC: 6.5 MG/DL
WBC # FLD AUTO: 9.47 K/UL

## 2018-11-13 PROCEDURE — 93000 ELECTROCARDIOGRAM COMPLETE: CPT

## 2018-11-13 PROCEDURE — 99214 OFFICE O/P EST MOD 30 MIN: CPT | Mod: 25

## 2018-11-13 PROCEDURE — 81003 URINALYSIS AUTO W/O SCOPE: CPT | Mod: QW

## 2018-11-13 NOTE — REVIEW OF SYSTEMS
[Nocturia] : nocturia [Negative] : Respiratory [de-identified] : history [de-identified] : history

## 2018-11-13 NOTE — HISTORY OF PRESENT ILLNESS
[FreeTextEntry1] : This is a 73-year-old male for evaluation and treatment of his ASHD gout hypertension hypercholesterolemia atrial fibrillation mood disorder [de-identified] : She states that he is feeling well he's had no chest pain or shortness of breath. He is exercising.

## 2018-11-13 NOTE — ASSESSMENT
[FreeTextEntry1] : This is a 73-year-old male whose history has been reviewed above\par \par He has a history of hypertension his blood pressure is under excellent control he has no orthostasis no medication changes\par \par He has a history of ASHD and hypercholesterolemia he is on a stat and a cholesterol profile is obtained and is satisfactory\par \par He has a mild elevated creatinine which has been chronic. This may be drug related the only thing we will do at this time is a ultrasound of the kidneys to assess size. His urinalysis is negative\par \par He has a history of gout he remains on allopurinol and has had no symptoms\par \par He has had an episode of ventricular fibrillation he follows up with cardiology he is not on anticoagulation\par \par He did have a stress echo with cardiology which he reports as normal\par \par He is on Lexapro operative blood tests have been drawn and are normal

## 2018-11-13 NOTE — PHYSICAL EXAM
[No Acute Distress] : no acute distress [Well Nourished] : well nourished [Well Developed] : well developed [Well-Appearing] : well-appearing [No JVD] : no jugular venous distention [No Respiratory Distress] : no respiratory distress  [Clear to Auscultation] : lungs were clear to auscultation bilaterally [No Accessory Muscle Use] : no accessory muscle use [Normal Rate] : normal rate  [Regular Rhythm] : with a regular rhythm [Normal S1, S2] : normal S1 and S2 [No Murmur] : no murmur heard [No Edema] : there was no peripheral edema [Soft] : abdomen soft [No HSM] : no HSM [No Joint Swelling] : no joint swelling [No Focal Deficits] : no focal deficits [de-identified] : history

## 2018-12-03 ENCOUNTER — RX RENEWAL (OUTPATIENT)
Age: 73
End: 2018-12-03

## 2018-12-05 ENCOUNTER — APPOINTMENT (OUTPATIENT)
Dept: INTERNAL MEDICINE | Facility: CLINIC | Age: 73
End: 2018-12-05
Payer: MEDICARE

## 2018-12-05 LAB
ALBUMIN SERPL ELPH-MCNC: 3.9 G/DL
ALP BLD-CCNC: 82 U/L
ALT SERPL-CCNC: 18 U/L
ANION GAP SERPL CALC-SCNC: 10 MMOL/L
AST SERPL-CCNC: 10 U/L
BILIRUB SERPL-MCNC: 0.3 MG/DL
BUN SERPL-MCNC: 17 MG/DL
CALCIUM SERPL-MCNC: 9.8 MG/DL
CHLORIDE SERPL-SCNC: 98 MMOL/L
CO2 SERPL-SCNC: 28 MMOL/L
CREAT SERPL-MCNC: 1.1 MG/DL
GLUCOSE SERPL-MCNC: 81 MG/DL
POTASSIUM SERPL-SCNC: 4.1 MMOL/L
PROT SERPL-MCNC: 7.4 G/DL
SODIUM SERPL-SCNC: 136 MMOL/L

## 2018-12-05 PROCEDURE — 36415 COLL VENOUS BLD VENIPUNCTURE: CPT

## 2019-02-12 ENCOUNTER — LABORATORY RESULT (OUTPATIENT)
Age: 74
End: 2019-02-12

## 2019-02-12 ENCOUNTER — NON-APPOINTMENT (OUTPATIENT)
Age: 74
End: 2019-02-12

## 2019-02-12 ENCOUNTER — APPOINTMENT (OUTPATIENT)
Dept: INTERNAL MEDICINE | Facility: CLINIC | Age: 74
End: 2019-02-12
Payer: MEDICARE

## 2019-02-12 VITALS — SYSTOLIC BLOOD PRESSURE: 140 MMHG | DIASTOLIC BLOOD PRESSURE: 80 MMHG

## 2019-02-12 VITALS
BODY MASS INDEX: 27.63 KG/M2 | DIASTOLIC BLOOD PRESSURE: 80 MMHG | SYSTOLIC BLOOD PRESSURE: 140 MMHG | WEIGHT: 193 LBS | HEIGHT: 70 IN

## 2019-02-12 LAB
25(OH)D3 SERPL-MCNC: 34.8 NG/ML
ALBUMIN SERPL ELPH-MCNC: 4.2 G/DL
ALP BLD-CCNC: 75 U/L
ALT SERPL-CCNC: 14 U/L
ANION GAP SERPL CALC-SCNC: 11 MMOL/L
AST SERPL-CCNC: 19 U/L
BASOPHILS # BLD AUTO: 0.03 K/UL
BASOPHILS NFR BLD AUTO: 0.3 %
BILIRUB SERPL-MCNC: 0.4 MG/DL
BILIRUB UR QL STRIP: NORMAL
BUN SERPL-MCNC: 19 MG/DL
CALCIUM SERPL-MCNC: 7.7 MG/DL
CHLORIDE SERPL-SCNC: 97 MMOL/L
CHOLEST SERPL-MCNC: 108 MG/DL
CHOLEST/HDLC SERPL: 3.6 RATIO
CK SERPL-CCNC: 121 U/L
CLARITY UR: CLEAR
CO2 SERPL-SCNC: 28 MMOL/L
COLLECTION METHOD: NORMAL
CREAT SERPL-MCNC: 1.5 MG/DL
EOSINOPHIL # BLD AUTO: 0.2 K/UL
EOSINOPHIL NFR BLD AUTO: 2.3 %
GGT SERPL-CCNC: 27 U/L
GLUCOSE SERPL-MCNC: 84 MG/DL
GLUCOSE UR-MCNC: NORMAL
HBA1C MFR BLD HPLC: 6.1 %
HCG UR QL: 0.2 EU/DL
HCT VFR BLD CALC: 45.4 %
HDLC SERPL-MCNC: 30 MG/DL
HGB BLD-MCNC: 14.9 G/DL
HGB UR QL STRIP.AUTO: NORMAL
IMM GRANULOCYTES NFR BLD AUTO: 0.2 %
KETONES UR-MCNC: NORMAL
LDLC SERPL CALC-MCNC: 53 MG/DL
LEUKOCYTE ESTERASE UR QL STRIP: NORMAL
LYMPHOCYTES # BLD AUTO: 2.47 K/UL
LYMPHOCYTES NFR BLD AUTO: 28.7 %
MAN DIFF?: NORMAL
MCHC RBC-ENTMCNC: 32.8 GM/DL
MCHC RBC-ENTMCNC: 32.9 PG
MCV RBC AUTO: 100.2 FL
MONOCYTES # BLD AUTO: 0.65 K/UL
MONOCYTES NFR BLD AUTO: 7.6 %
NEUTROPHILS # BLD AUTO: 5.23 K/UL
NEUTROPHILS NFR BLD AUTO: 60.9 %
NITRITE UR QL STRIP: NORMAL
PH UR STRIP: 6
PLATELET # BLD AUTO: 156 K/UL
POTASSIUM SERPL-SCNC: 3.5 MMOL/L
PROT SERPL-MCNC: 7.1 G/DL
PROT UR STRIP-MCNC: NORMAL
RBC # BLD: 4.53 M/UL
RBC # FLD: 14.6 %
SAVE SPECIMEN: NORMAL
SODIUM SERPL-SCNC: 136 MMOL/L
SP GR UR STRIP: 1.01
T3RU NFR SERPL: 0.91 INDEX
T4 SERPL-MCNC: 6.1 UG/DL
TRIGL SERPL-MCNC: 126 MG/DL
TSH SERPL-ACNC: 2.32 UIU/ML
URATE SERPL-MCNC: 6.4 MG/DL
WBC # FLD AUTO: 8.6 K/UL

## 2019-02-12 PROCEDURE — 36415 COLL VENOUS BLD VENIPUNCTURE: CPT

## 2019-02-12 PROCEDURE — 99214 OFFICE O/P EST MOD 30 MIN: CPT | Mod: 25

## 2019-02-12 PROCEDURE — 93000 ELECTROCARDIOGRAM COMPLETE: CPT

## 2019-02-12 PROCEDURE — 81003 URINALYSIS AUTO W/O SCOPE: CPT | Mod: QW

## 2019-02-12 NOTE — HISTORY OF PRESENT ILLNESS
[FreeTextEntry1] : This is a 73-year-old male for ongoing wish for treatment of his ASHD hypertension hypercholesterolemia depression mild cognitive decline and gout [de-identified] : Patient's history is taken partly from his wife reported from the patient\par \par He states he is feeling well he is able to walk his mood he states his up and down depending on the day. He has had no chest pain or shortness of breath. He states that he has lost some weight and has had some change in his appetite he is no longer eating meat. He is not anorectic

## 2019-02-12 NOTE — PHYSICAL EXAM
[No Acute Distress] : no acute distress [Well Nourished] : well nourished [Well Developed] : well developed [No JVD] : no jugular venous distention [Supple] : supple [No Lymphadenopathy] : no lymphadenopathy [No Respiratory Distress] : no respiratory distress  [Clear to Auscultation] : lungs were clear to auscultation bilaterally [No Accessory Muscle Use] : no accessory muscle use [Normal Rate] : normal rate  [Regular Rhythm] : with a regular rhythm [Normal S1, S2] : normal S1 and S2 [No Edema] : there was no peripheral edema [Soft] : abdomen soft [Non Tender] : non-tender [No HSM] : no HSM [No Joint Swelling] : no joint swelling [Coordination Grossly Intact] : coordination grossly intact [No Focal Deficits] : no focal deficits [de-identified] : some neck pain [de-identified] : mental status unchanged [de-identified] : seems a bit more relaxed

## 2019-02-12 NOTE — PHYSICAL EXAM
[No Acute Distress] : no acute distress [Well Nourished] : well nourished [Well Developed] : well developed [No JVD] : no jugular venous distention [Supple] : supple [No Lymphadenopathy] : no lymphadenopathy [No Respiratory Distress] : no respiratory distress  [Clear to Auscultation] : lungs were clear to auscultation bilaterally [No Accessory Muscle Use] : no accessory muscle use [Normal Rate] : normal rate  [Regular Rhythm] : with a regular rhythm [Normal S1, S2] : normal S1 and S2 [No Edema] : there was no peripheral edema [Soft] : abdomen soft [Non Tender] : non-tender [No HSM] : no HSM [No Joint Swelling] : no joint swelling [Coordination Grossly Intact] : coordination grossly intact [No Focal Deficits] : no focal deficits [de-identified] : some neck pain [de-identified] : mental status unchanged [de-identified] : seems a bit more relaxed

## 2019-02-12 NOTE — ASSESSMENT
[FreeTextEntry1] : This is a 73-year-old male whose history has been reviewed above\par \par He has a history of hypertension his blood pressure slightly above goal he took his blood pressure medicines directly before I took his blood pressure. At this point we will just asked him to continue to watch his diet and we'll repeat his blood pressure when he takes his medicines at a more appropriate time\par \par He has a history of ASHD he continues to be symptom free in terms of cardiac disease his cardiac exam is unchanged his EKG is unchanged\par \par His history of gout who is on allopurinol he has had no further episodes a uric acid has been obtained no medication changes at this time\par \par She has a history of depression and memory loss. He is monitored by his wife he remains on an SSRI. The dose is unclear to me at this time because his wife is monitoring the medication. However he does seem to be a bit more upbeat and appropriate. Appropriate blood tests were drawn

## 2019-02-12 NOTE — HISTORY OF PRESENT ILLNESS
[FreeTextEntry1] : This is a 73-year-old male for ongoing wish for treatment of his ASHD hypertension hypercholesterolemia depression mild cognitive decline and gout [de-identified] : Patient's history is taken partly from his wife reported from the patient\par \par He states he is feeling well he is able to walk his mood he states his up and down depending on the day. He has had no chest pain or shortness of breath. He states that he has lost some weight and has had some change in his appetite he is no longer eating meat. He is not anorectic

## 2019-02-12 NOTE — REVIEW OF SYSTEMS
[Memory Loss] : memory loss [Anxiety] : anxiety [Depression] : depression [Negative] : Respiratory [FreeTextEntry9] : occasional joint pain

## 2019-02-13 DIAGNOSIS — D75.89 OTHER SPECIFIED DISEASES OF BLOOD AND BLOOD-FORMING ORGANS: ICD-10-CM

## 2019-02-13 LAB — PSA SERPL-MCNC: 0.57 NG/ML

## 2019-04-09 ENCOUNTER — APPOINTMENT (OUTPATIENT)
Dept: INTERNAL MEDICINE | Facility: CLINIC | Age: 74
End: 2019-04-09
Payer: MEDICARE

## 2019-04-09 VITALS — DIASTOLIC BLOOD PRESSURE: 82 MMHG | SYSTOLIC BLOOD PRESSURE: 138 MMHG

## 2019-04-09 VITALS — TEMPERATURE: 97.4 F

## 2019-04-09 VITALS — WEIGHT: 198 LBS | HEIGHT: 70 IN | BODY MASS INDEX: 28.35 KG/M2

## 2019-04-09 PROCEDURE — 99213 OFFICE O/P EST LOW 20 MIN: CPT

## 2019-04-09 NOTE — ASSESSMENT
[FreeTextEntry1] : This is a 74-year-old male in accompanied by his wife. He has had an episode of hiccups he has been refractive to appropriate maneuvers and to Zantac. I will put him on a PPI. I offered him  was Gabapentin  We will obtain a CT of the abdomen and chest\par \par

## 2019-04-09 NOTE — PHYSICAL EXAM
[No Acute Distress] : no acute distress [Well Nourished] : well nourished [No JVD] : no jugular venous distention [No Accessory Muscle Use] : no accessory muscle use [Clear to Auscultation] : lungs were clear to auscultation bilaterally [No Respiratory Distress] : no respiratory distress  [Regular Rhythm] : with a regular rhythm [Normal Rate] : normal rate  [Normal S1, S2] : normal S1 and S2 [Non Tender] : non-tender [Soft] : abdomen soft [No HSM] : no HSM

## 2019-04-09 NOTE — HISTORY OF PRESENT ILLNESS
[FreeTextEntry8] : This is a 74-year-old male who presents with 3 days of hiccups. He did have one previous episode in the past when he was given steroids. This dissipated with the use of Zantac.\par \par He tried this as well as appropriate maneuvers without any benefit during this episode

## 2019-04-09 NOTE — REVIEW OF SYSTEMS
[Fatigue] : fatigue [Negative] : Respiratory [FreeTextEntry7] : history [de-identified] : no change [de-identified] : no change

## 2019-04-19 ENCOUNTER — MEDICATION RENEWAL (OUTPATIENT)
Age: 74
End: 2019-04-19

## 2019-04-19 DIAGNOSIS — R06.6 HICCOUGH: ICD-10-CM

## 2019-04-24 ENCOUNTER — TRANSCRIPTION ENCOUNTER (OUTPATIENT)
Age: 74
End: 2019-04-24

## 2019-05-20 ENCOUNTER — LABORATORY RESULT (OUTPATIENT)
Age: 74
End: 2019-05-20

## 2019-05-20 ENCOUNTER — NON-APPOINTMENT (OUTPATIENT)
Age: 74
End: 2019-05-20

## 2019-05-20 ENCOUNTER — APPOINTMENT (OUTPATIENT)
Dept: INTERNAL MEDICINE | Facility: CLINIC | Age: 74
End: 2019-05-20
Payer: MEDICARE

## 2019-05-20 VITALS
HEIGHT: 70 IN | WEIGHT: 196 LBS | SYSTOLIC BLOOD PRESSURE: 112 MMHG | DIASTOLIC BLOOD PRESSURE: 70 MMHG | BODY MASS INDEX: 28.06 KG/M2

## 2019-05-20 VITALS — SYSTOLIC BLOOD PRESSURE: 120 MMHG | DIASTOLIC BLOOD PRESSURE: 82 MMHG

## 2019-05-20 LAB
ALBUMIN SERPL ELPH-MCNC: 3.9 G/DL
ALP BLD-CCNC: 80 U/L
ALT SERPL-CCNC: 14 U/L
ANION GAP SERPL CALC-SCNC: 11 MMOL/L
AST SERPL-CCNC: 16 U/L
BASOPHILS # BLD AUTO: 0.04 K/UL
BASOPHILS NFR BLD AUTO: 0.4 %
BILIRUB SERPL-MCNC: 0.4 MG/DL
BILIRUB UR QL STRIP: NORMAL
BUN SERPL-MCNC: 24 MG/DL
CALCIUM SERPL-MCNC: 9.5 MG/DL
CHLORIDE SERPL-SCNC: 101 MMOL/L
CHOLEST SERPL-MCNC: 97 MG/DL
CHOLEST/HDLC SERPL: 3.6 RATIO
CK SERPL-CCNC: 92 U/L
CLARITY UR: CLEAR
CO2 SERPL-SCNC: 26 MMOL/L
COLLECTION METHOD: NORMAL
CREAT SERPL-MCNC: 1.57 MG/DL
EOSINOPHIL # BLD AUTO: 0.37 K/UL
EOSINOPHIL NFR BLD AUTO: 3.5 %
GGT SERPL-CCNC: 23 U/L
GLUCOSE SERPL-MCNC: 87 MG/DL
GLUCOSE UR-MCNC: NORMAL
HCG UR QL: 0.2 EU/DL
HCT VFR BLD CALC: 42.2 %
HDLC SERPL-MCNC: 27 MG/DL
HGB BLD-MCNC: 14.4 G/DL
HGB UR QL STRIP.AUTO: NORMAL
IMM GRANULOCYTES NFR BLD AUTO: 0.9 %
KETONES UR-MCNC: NORMAL
LDLC SERPL CALC-MCNC: 46 MG/DL
LEUKOCYTE ESTERASE UR QL STRIP: NORMAL
LYMPHOCYTES # BLD AUTO: 2.52 K/UL
LYMPHOCYTES NFR BLD AUTO: 23.9 %
MAN DIFF?: NORMAL
MCHC RBC-ENTMCNC: 33.3 PG
MCHC RBC-ENTMCNC: 34.1 GM/DL
MCV RBC AUTO: 97.7 FL
MONOCYTES # BLD AUTO: 0.78 K/UL
MONOCYTES NFR BLD AUTO: 7.4 %
NEUTROPHILS # BLD AUTO: 6.74 K/UL
NEUTROPHILS NFR BLD AUTO: 63.9 %
NITRITE UR QL STRIP: NORMAL
PH UR STRIP: 5.5
PLATELET # BLD AUTO: 156 K/UL
POTASSIUM SERPL-SCNC: 4.2 MMOL/L
PROT SERPL-MCNC: 6.4 G/DL
PROT UR STRIP-MCNC: NORMAL
RBC # BLD: 4.32 M/UL
RBC # FLD: 14.5 %
SODIUM SERPL-SCNC: 138 MMOL/L
SP GR UR STRIP: 1.01
T3RU NFR SERPL: 0.7 TBI
T4 SERPL-MCNC: 6.3 UG/DL
TRIGL SERPL-MCNC: 122 MG/DL
TSH SERPL-ACNC: 1.89 UIU/ML
URATE SERPL-MCNC: 6.7 MG/DL
WBC # FLD AUTO: 10.54 K/UL

## 2019-05-20 PROCEDURE — 81003 URINALYSIS AUTO W/O SCOPE: CPT | Mod: QW

## 2019-05-20 PROCEDURE — 99214 OFFICE O/P EST MOD 30 MIN: CPT | Mod: 25

## 2019-05-20 PROCEDURE — 93000 ELECTROCARDIOGRAM COMPLETE: CPT

## 2019-05-20 PROCEDURE — 36415 COLL VENOUS BLD VENIPUNCTURE: CPT

## 2019-05-20 NOTE — PHYSICAL EXAM
[No Acute Distress] : no acute distress [No JVD] : no jugular venous distention [No Respiratory Distress] : no respiratory distress  [Clear to Auscultation] : lungs were clear to auscultation bilaterally [No Accessory Muscle Use] : no accessory muscle use [Normal Rate] : normal rate  [Regular Rhythm] : with a regular rhythm [Normal S1, S2] : normal S1 and S2 [No Edema] : there was no peripheral edema [Soft] : abdomen soft [Non Tender] : non-tender [No HSM] : no HSM [No Focal Deficits] : no focal deficits [de-identified] : overweight [de-identified] : unchanged

## 2019-05-20 NOTE — ASSESSMENT
[FreeTextEntry1] : This is a healthy-appearing 74 room whose history has been reviewed above\par \par He has a history of hypertension his blood pressure is under excellent control no medication changes\par \par He has a history of hypercholesterolemia he remaiins on a statin. Cholesterol profile has been obtained medication changes predicated on results\par \par He did have protracted checkups he was seen by GI he was prescribed baclofen which had a excellent effect no further checkups\par \par He has a history of ASHD he has no cardiovascular complaints his EKG is unchanged no intervention

## 2019-05-21 LAB
25(OH)D3 SERPL-MCNC: 27.4 NG/ML
ESTIMATED AVERAGE GLUCOSE: 140 MG/DL
HBA1C MFR BLD HPLC: 6.5 %
SAVE SPECIMEN: NORMAL

## 2019-07-15 ENCOUNTER — MEDICATION RENEWAL (OUTPATIENT)
Age: 74
End: 2019-07-15

## 2019-09-04 ENCOUNTER — APPOINTMENT (OUTPATIENT)
Dept: PULMONOLOGY | Facility: CLINIC | Age: 74
End: 2019-09-04
Payer: MEDICARE

## 2019-09-04 VITALS
RESPIRATION RATE: 16 BRPM | DIASTOLIC BLOOD PRESSURE: 84 MMHG | HEART RATE: 62 BPM | SYSTOLIC BLOOD PRESSURE: 147 MMHG | OXYGEN SATURATION: 93 %

## 2019-09-04 DIAGNOSIS — R06.83 SNORING: ICD-10-CM

## 2019-09-04 PROCEDURE — 94727 GAS DIL/WSHOT DETER LNG VOL: CPT

## 2019-09-04 PROCEDURE — 94060 EVALUATION OF WHEEZING: CPT

## 2019-09-04 PROCEDURE — 99204 OFFICE O/P NEW MOD 45 MIN: CPT | Mod: 25

## 2019-09-04 PROCEDURE — 94729 DIFFUSING CAPACITY: CPT

## 2019-09-04 NOTE — ASSESSMENT
[FreeTextEntry1] : For HSS\par I had a lengthy discussion with patient and wife regarding sleep hygiene. This will be implemented.\par Recommendations included avoiding daytime naps as well as daytime caffeine. Start to go to bed a few minutes later every few days to target the time of approximately 11:30.\par Recommendations we'll also made regarding what to do should he awakened in the middle night and not be able to go directly back to sleep.\par \par F/U in few weeks re implementation of above and results of HSS

## 2019-09-04 NOTE — PHYSICAL EXAM
[Lungs Percussion] : the lungs were normal to percussion [Normal Conjunctiva] : the conjunctiva exhibited no abnormalities [Normal Oropharynx] : normal oropharynx [Jugular Venous Distention Increased] : there was no jugular-venous distention [Heart Sounds] : normal S1 and S2 [Abdomen Soft] : soft [Abdomen Tenderness] : non-tender [] : no hepato-splenomegaly [Nail Clubbing] : no clubbing of the fingernails [Cyanosis, Localized] : no localized cyanosis [FreeTextEntry1] : Few basilar crackles

## 2019-09-04 NOTE — HISTORY OF PRESENT ILLNESS
[Recent  Weight Gain] : recent weight gain [Awakes Unrefreshed] : awakening unrefreshed [Snoring] : no snoring [Awakes with Headache] : no headache upon awakening [FreeTextEntry1] : RANDY ALCARAZ is a 74 year old  M referred for pulmonary evaluation for insomnia\par \par Goes to bed at 1030 wakes up around midnight\par Gets up 2-3 nights for urination\par increased if anxious. At times has to sit at side of bed. \par positive response to xanax .25. Keeps asleep for several hours.\par Thinks needs 4-6 hours. \par Tends to nap.\par no snoring. no witnessed apnea. \par \par Goes to sleep approx. 10:30 PM. Feels needs approx. 6 hours of sleep.

## 2019-09-04 NOTE — PROCEDURE
[FreeTextEntry1] : Pulmonary function testing.\par There is a mild ventilatory impairment in a restrictive pattern. There is no significant bronchodilator response. Normal Lung Volumes. There is a mild diffusion impairment. \par \par [place] [address]

## 2019-09-04 NOTE — DISCUSSION/SUMMARY
[FreeTextEntry1] : Probable primary insomnia\par R/O JU\par mild restrictive physiology related to body habitus.

## 2019-09-17 ENCOUNTER — LABORATORY RESULT (OUTPATIENT)
Age: 74
End: 2019-09-17

## 2019-09-17 ENCOUNTER — APPOINTMENT (OUTPATIENT)
Dept: INTERNAL MEDICINE | Facility: CLINIC | Age: 74
End: 2019-09-17
Payer: MEDICARE

## 2019-09-17 ENCOUNTER — NON-APPOINTMENT (OUTPATIENT)
Age: 74
End: 2019-09-17

## 2019-09-17 ENCOUNTER — APPOINTMENT (OUTPATIENT)
Dept: PULMONOLOGY | Facility: CLINIC | Age: 74
End: 2019-09-17
Payer: MEDICARE

## 2019-09-17 VITALS
HEIGHT: 70 IN | BODY MASS INDEX: 28.2 KG/M2 | SYSTOLIC BLOOD PRESSURE: 120 MMHG | DIASTOLIC BLOOD PRESSURE: 78 MMHG | WEIGHT: 197 LBS

## 2019-09-17 VITALS — SYSTOLIC BLOOD PRESSURE: 130 MMHG | DIASTOLIC BLOOD PRESSURE: 76 MMHG

## 2019-09-17 DIAGNOSIS — E83.52 HYPERCALCEMIA: ICD-10-CM

## 2019-09-17 LAB
BILIRUB UR QL STRIP: NORMAL
CLARITY UR: CLEAR
COLLECTION METHOD: NORMAL
GLUCOSE UR-MCNC: NORMAL
HCG UR QL: 0.2 EU/DL
HGB UR QL STRIP.AUTO: NORMAL
KETONES UR-MCNC: NORMAL
LEUKOCYTE ESTERASE UR QL STRIP: NORMAL
NITRITE UR QL STRIP: NORMAL
PH UR STRIP: 5.5
PROT UR STRIP-MCNC: NORMAL
SP GR UR STRIP: 1.01

## 2019-09-17 PROCEDURE — 93000 ELECTROCARDIOGRAM COMPLETE: CPT

## 2019-09-17 PROCEDURE — 36415 COLL VENOUS BLD VENIPUNCTURE: CPT

## 2019-09-17 PROCEDURE — 95800 SLP STDY UNATTENDED: CPT | Mod: 52

## 2019-09-17 PROCEDURE — 81003 URINALYSIS AUTO W/O SCOPE: CPT | Mod: QW

## 2019-09-17 PROCEDURE — G0008: CPT

## 2019-09-17 PROCEDURE — 99214 OFFICE O/P EST MOD 30 MIN: CPT | Mod: 25

## 2019-09-17 PROCEDURE — 90686 IIV4 VACC NO PRSV 0.5 ML IM: CPT

## 2019-09-17 NOTE — ASSESSMENT
[FreeTextEntry1] : This is a 74-year-old male whose history has been reviewed above\par \par He has a history of ASHD he is asymptomatic his EKG is unchanged his cardiac exam is unchanged he will be followed by cardiology no intervention\par \par He has a history of hypertension his blood pressure is at goal he has no orthostasis no medications\par \par He has a history of insomnia and questionable sleep disorder he has been seen by pulmonary who gave him some sleep hands however they do not seem to have been effective he will however go for a sleep study\par \par His mood seems a bit down we are addressing this. Hopefully we will increase his exercise and hopefully he will lose some weight.\par \par A he has a history of hypercholesterolemia remains on a statin a cholesterol profile pain medication change is predicated on the results\par He is declining a neurologic reevaluation

## 2019-09-17 NOTE — PHYSICAL EXAM
[Normal Sclera/Conjunctiva] : normal sclera/conjunctiva [No JVD] : no jugular venous distention [No Edema] : there was no peripheral edema [Normal] : soft, non-tender, non-distended, no masses palpated, no HSM and normal bowel sounds [No Focal Deficits] : no focal deficits [de-identified] : table

## 2019-09-17 NOTE — REVIEW OF SYSTEMS
[Fatigue] : fatigue [Hearing Loss] : hearing loss [Negative] : Musculoskeletal [de-identified] : history [de-identified] : history

## 2019-09-17 NOTE — HISTORY OF PRESENT ILLNESS
[FreeTextEntry1] : This is a 74-year-old male for evaluation and treatment of his hypertension hypercholesterolemia anxiety cognitive impairment ASHD insomnia [de-identified] : Most of the complaints are coming from his wife. He has continued deterioration he seems more depressed his cognitive abilities are diminished. He has terrible insomnia and he is refusing to walk with his wife for unknown reasons\par \par She states that he can walk without difficulty\par \par He did have a pulmonary evaluation for his insomnia and questionable sleep apnea\par \par

## 2019-09-18 LAB
25(OH)D3 SERPL-MCNC: 34.1 NG/ML
ALBUMIN SERPL ELPH-MCNC: 4 G/DL
ALP BLD-CCNC: 80 U/L
ALT SERPL-CCNC: 11 U/L
ANION GAP SERPL CALC-SCNC: 10 MMOL/L
AST SERPL-CCNC: 14 U/L
BASOPHILS # BLD AUTO: 0.05 K/UL
BASOPHILS NFR BLD AUTO: 0.5 %
BILIRUB SERPL-MCNC: 0.4 MG/DL
BUN SERPL-MCNC: 26 MG/DL
CALCIUM SERPL-MCNC: 9.7 MG/DL
CHLORIDE SERPL-SCNC: 100 MMOL/L
CHOLEST SERPL-MCNC: 94 MG/DL
CHOLEST/HDLC SERPL: 3.2 RATIO
CK SERPL-CCNC: 90 U/L
CO2 SERPL-SCNC: 27 MMOL/L
CREAT SERPL-MCNC: 1.47 MG/DL
EOSINOPHIL # BLD AUTO: 0.26 K/UL
EOSINOPHIL NFR BLD AUTO: 2.6 %
ESTIMATED AVERAGE GLUCOSE: 131 MG/DL
GGT SERPL-CCNC: 23 U/L
GLUCOSE SERPL-MCNC: 94 MG/DL
HBA1C MFR BLD HPLC: 6.2 %
HCT VFR BLD CALC: 42.5 %
HDLC SERPL-MCNC: 29 MG/DL
HGB BLD-MCNC: 14.2 G/DL
IMM GRANULOCYTES NFR BLD AUTO: 0.4 %
LDLC SERPL CALC-MCNC: 40 MG/DL
LYMPHOCYTES # BLD AUTO: 2.48 K/UL
LYMPHOCYTES NFR BLD AUTO: 25.1 %
MAN DIFF?: NORMAL
MCHC RBC-ENTMCNC: 33 PG
MCHC RBC-ENTMCNC: 33.4 GM/DL
MCV RBC AUTO: 98.8 FL
MONOCYTES # BLD AUTO: 0.99 K/UL
MONOCYTES NFR BLD AUTO: 10 %
NEUTROPHILS # BLD AUTO: 6.05 K/UL
NEUTROPHILS NFR BLD AUTO: 61.4 %
PLATELET # BLD AUTO: 170 K/UL
POTASSIUM SERPL-SCNC: 3.6 MMOL/L
PROT SERPL-MCNC: 6.7 G/DL
PSA FREE FLD-MCNC: 39 %
PSA FREE SERPL-MCNC: 0.24 NG/ML
PSA SERPL-MCNC: 0.62 NG/ML
PSA SERPL-MCNC: 0.62 NG/ML
RBC # BLD: 4.3 M/UL
RBC # FLD: 14.6 %
SAVE SPECIMEN: NORMAL
SODIUM SERPL-SCNC: 137 MMOL/L
T3RU NFR SERPL: 0.8 TBI
T4 SERPL-MCNC: 5.6 UG/DL
TRIGL SERPL-MCNC: 124 MG/DL
TROPONIN I SERPL-MCNC: 0.01 NG/ML
TSH SERPL-ACNC: 1.63 UIU/ML
URATE SERPL-MCNC: 6.9 MG/DL
WBC # FLD AUTO: 9.87 K/UL

## 2019-09-18 PROCEDURE — 95800 SLP STDY UNATTENDED: CPT | Mod: 52

## 2019-10-07 ENCOUNTER — APPOINTMENT (OUTPATIENT)
Dept: PULMONOLOGY | Facility: CLINIC | Age: 74
End: 2019-10-07
Payer: MEDICARE

## 2019-10-07 VITALS
RESPIRATION RATE: 16 BRPM | WEIGHT: 197 LBS | DIASTOLIC BLOOD PRESSURE: 80 MMHG | HEIGHT: 70 IN | HEART RATE: 62 BPM | SYSTOLIC BLOOD PRESSURE: 145 MMHG | BODY MASS INDEX: 28.2 KG/M2 | OXYGEN SATURATION: 95 %

## 2019-10-07 VITALS
OXYGEN SATURATION: 94 % | HEART RATE: 64 BPM | SYSTOLIC BLOOD PRESSURE: 120 MMHG | RESPIRATION RATE: 14 BRPM | DIASTOLIC BLOOD PRESSURE: 76 MMHG

## 2019-10-07 PROCEDURE — 99213 OFFICE O/P EST LOW 20 MIN: CPT

## 2019-10-08 NOTE — ASSESSMENT
[FreeTextEntry1] : if starting auto cpap 5-15 with a res med air fit 20 large nasal mask or will look into dental device

## 2019-10-08 NOTE — PHYSICAL EXAM
[Normal Conjunctiva] : the conjunctiva exhibited no abnormalities [Jugular Venous Distention Increased] : there was no jugular-venous distention [Normal Oropharynx] : normal oropharynx [Heart Sounds] : normal S1 and S2 [Lungs Percussion] : the lungs were normal to percussion [Abdomen Soft] : soft [Abdomen Tenderness] : non-tender [] : no hepato-splenomegaly [Nail Clubbing] : no clubbing of the fingernails [Cyanosis, Localized] : no localized cyanosis [FreeTextEntry1] : Few basilar crackles

## 2019-11-18 ENCOUNTER — RX RENEWAL (OUTPATIENT)
Age: 74
End: 2019-11-18

## 2020-02-05 ENCOUNTER — LABORATORY RESULT (OUTPATIENT)
Age: 75
End: 2020-02-05

## 2020-02-05 ENCOUNTER — APPOINTMENT (OUTPATIENT)
Dept: INTERNAL MEDICINE | Facility: CLINIC | Age: 75
End: 2020-02-05
Payer: MEDICARE

## 2020-02-05 LAB
BILIRUB UR QL STRIP: NORMAL
CLARITY UR: CLEAR
COLLECTION METHOD: NORMAL
GLUCOSE UR-MCNC: NORMAL
HGB UR QL STRIP.AUTO: NORMAL
KETONES UR-MCNC: NORMAL
LEUKOCYTE ESTERASE UR QL STRIP: NORMAL
NITRITE UR QL STRIP: NORMAL
PH UR STRIP: 6
PROT UR STRIP-MCNC: NORMAL
SP GR UR STRIP: 1.02

## 2020-02-05 PROCEDURE — 81003 URINALYSIS AUTO W/O SCOPE: CPT | Mod: QW

## 2020-02-05 PROCEDURE — 36415 COLL VENOUS BLD VENIPUNCTURE: CPT

## 2020-02-06 LAB
25(OH)D3 SERPL-MCNC: 28.3 NG/ML
ALBUMIN SERPL ELPH-MCNC: 4.4 G/DL
ALP BLD-CCNC: 82 U/L
ALT SERPL-CCNC: 17 U/L
ANION GAP SERPL CALC-SCNC: 13 MMOL/L
AST SERPL-CCNC: 23 U/L
BASOPHILS # BLD AUTO: 0.07 K/UL
BASOPHILS NFR BLD AUTO: 0.7 %
BILIRUB SERPL-MCNC: 0.4 MG/DL
BUN SERPL-MCNC: 24 MG/DL
CALCIUM SERPL-MCNC: 9.9 MG/DL
CHLORIDE SERPL-SCNC: 99 MMOL/L
CHOLEST SERPL-MCNC: 103 MG/DL
CHOLEST/HDLC SERPL: 3.4 RATIO
CK SERPL-CCNC: 122 U/L
CO2 SERPL-SCNC: 26 MMOL/L
CREAT SERPL-MCNC: 1.5 MG/DL
EOSINOPHIL # BLD AUTO: 0.24 K/UL
EOSINOPHIL NFR BLD AUTO: 2.3 %
GGT SERPL-CCNC: 34 U/L
GLUCOSE SERPL-MCNC: 84 MG/DL
HCT VFR BLD CALC: 45.4 %
HDLC SERPL-MCNC: 30 MG/DL
HGB BLD-MCNC: 15.5 G/DL
IMM GRANULOCYTES NFR BLD AUTO: 0.5 %
LDLC SERPL CALC-MCNC: 38 MG/DL
LYMPHOCYTES # BLD AUTO: 2.4 K/UL
LYMPHOCYTES NFR BLD AUTO: 23 %
MAN DIFF?: NORMAL
MCHC RBC-ENTMCNC: 33.9 PG
MCHC RBC-ENTMCNC: 34.1 GM/DL
MCV RBC AUTO: 99.3 FL
MONOCYTES # BLD AUTO: 0.88 K/UL
MONOCYTES NFR BLD AUTO: 8.4 %
NEUTROPHILS # BLD AUTO: 6.78 K/UL
NEUTROPHILS NFR BLD AUTO: 65.1 %
PLATELET # BLD AUTO: 167 K/UL
POTASSIUM SERPL-SCNC: 3.9 MMOL/L
PROT SERPL-MCNC: 7.2 G/DL
PSA SERPL-MCNC: 0.68 NG/ML
RBC # BLD: 4.57 M/UL
RBC # FLD: 14.7 %
SAVE SPECIMEN: NORMAL
SODIUM SERPL-SCNC: 137 MMOL/L
T3RU NFR SERPL: 0.8 TBI
T4 SERPL-MCNC: 7 UG/DL
TRIGL SERPL-MCNC: 171 MG/DL
TSH SERPL-ACNC: 2.86 UIU/ML
URATE SERPL-MCNC: 6.3 MG/DL
WBC # FLD AUTO: 10.42 K/UL

## 2020-02-12 ENCOUNTER — APPOINTMENT (OUTPATIENT)
Dept: INTERNAL MEDICINE | Facility: CLINIC | Age: 75
End: 2020-02-12
Payer: MEDICARE

## 2020-02-12 ENCOUNTER — NON-APPOINTMENT (OUTPATIENT)
Age: 75
End: 2020-02-12

## 2020-02-12 VITALS
DIASTOLIC BLOOD PRESSURE: 86 MMHG | SYSTOLIC BLOOD PRESSURE: 138 MMHG | WEIGHT: 196 LBS | BODY MASS INDEX: 28.06 KG/M2 | HEIGHT: 70 IN

## 2020-02-12 PROCEDURE — 81003 URINALYSIS AUTO W/O SCOPE: CPT | Mod: QW

## 2020-02-12 PROCEDURE — 36415 COLL VENOUS BLD VENIPUNCTURE: CPT

## 2020-02-12 PROCEDURE — 99213 OFFICE O/P EST LOW 20 MIN: CPT | Mod: 25

## 2020-02-12 PROCEDURE — G0439: CPT

## 2020-02-12 PROCEDURE — 93000 ELECTROCARDIOGRAM COMPLETE: CPT | Mod: 59

## 2020-02-12 NOTE — PHYSICAL EXAM
[Well Developed] : well developed [No Acute Distress] : no acute distress [Well Nourished] : well nourished [Well-Appearing] : well-appearing [Normal Sclera/Conjunctiva] : normal sclera/conjunctiva [PERRL] : pupils equal round and reactive to light [Normal Outer Ear/Nose] : the outer ears and nose were normal in appearance [EOMI] : extraocular movements intact [No JVD] : no jugular venous distention [Normal Oropharynx] : the oropharynx was normal [Thyroid Normal, No Nodules] : the thyroid was normal and there were no nodules present [Supple] : supple [No Lymphadenopathy] : no lymphadenopathy [No Accessory Muscle Use] : no accessory muscle use [Clear to Auscultation] : lungs were clear to auscultation bilaterally [No Respiratory Distress] : no respiratory distress  [Regular Rhythm] : with a regular rhythm [Normal Rate] : normal rate  [Normal S1, S2] : normal S1 and S2 [No Murmur] : no murmur heard [No Carotid Bruits] : no carotid bruits [Pedal Pulses Present] : the pedal pulses are present [No Varicosities] : no varicosities [No Abdominal Bruit] : a ~M bruit was not heard ~T in the abdomen [No Edema] : there was no peripheral edema [No Palpable Aorta] : no palpable aorta [Non Tender] : non-tender [No Extremity Clubbing/Cyanosis] : no extremity clubbing/cyanosis [Soft] : abdomen soft [No Masses] : no abdominal mass palpated [Non-distended] : non-distended [Normal Bowel Sounds] : normal bowel sounds [No HSM] : no HSM [No Mass] : no mass [Stool Occult Blood] : stool negative for occult blood [Normal Sphincter Tone] : normal sphincter tone [Normal Anterior Cervical Nodes] : no anterior cervical lymphadenopathy [Normal Posterior Cervical Nodes] : no posterior cervical lymphadenopathy [No CVA Tenderness] : no CVA  tenderness [No Spinal Tenderness] : no spinal tenderness [No Joint Swelling] : no joint swelling [Grossly Normal Strength/Tone] : grossly normal strength/tone [No Rash] : no rash [Coordination Grossly Intact] : coordination grossly intact [No Focal Deficits] : no focal deficits [Normal Gait] : normal gait [Deep Tendon Reflexes (DTR)] : deep tendon reflexes were 2+ and symmetric [Normal Affect] : the affect was normal [Normal Insight/Judgement] : insight and judgment were intact

## 2020-02-12 NOTE — HISTORY OF PRESENT ILLNESS
[FreeTextEntry1] : This is a 74-year-old male for annual health assessment [de-identified] : He has a history of ASHD hypertension hypercholesterolemia mild obstructive sleep apnea cognitive difficulties gout\par \par

## 2020-02-12 NOTE — ASSESSMENT
[FreeTextEntry1] : This is a 74-year-old male with a history of ASHD he had an angioplasty in 1991 he is asymptomatic he is followed by cardiology his EKG is unchanged his cardiac exam is unchanged no intervention\par \par He has a history of hypertension his blood pressure is under excellent control he has no orthostasis no medication she\par \par He has a history of hypercholesterolemia remains on a statin his cholesterol has been excellent\par \par Suffering from low back pain. This is manageable we did talk about possible alternatives however he seems to be doing well\par \par He did have a colonoscopy in 2018 he had a CAT scan of the chest and abdomen in 2019 which was negative he is up-to-date with vaccinations with the exception of the shingle shot\par \par She does have some mild cognitive difficulties and irritability he is on an SSRI and is somewhat improved appropriate blood tests were drawn we will continue the medication

## 2020-06-17 ENCOUNTER — LABORATORY RESULT (OUTPATIENT)
Age: 75
End: 2020-06-17

## 2020-06-17 ENCOUNTER — APPOINTMENT (OUTPATIENT)
Dept: INTERNAL MEDICINE | Facility: CLINIC | Age: 75
End: 2020-06-17
Payer: MEDICARE

## 2020-06-17 ENCOUNTER — NON-APPOINTMENT (OUTPATIENT)
Age: 75
End: 2020-06-17

## 2020-06-17 VITALS
SYSTOLIC BLOOD PRESSURE: 138 MMHG | BODY MASS INDEX: 27.77 KG/M2 | DIASTOLIC BLOOD PRESSURE: 86 MMHG | HEIGHT: 70 IN | TEMPERATURE: 97.9 F | WEIGHT: 194 LBS

## 2020-06-17 VITALS — SYSTOLIC BLOOD PRESSURE: 142 MMHG | DIASTOLIC BLOOD PRESSURE: 82 MMHG

## 2020-06-17 PROCEDURE — 36415 COLL VENOUS BLD VENIPUNCTURE: CPT

## 2020-06-17 PROCEDURE — 99214 OFFICE O/P EST MOD 30 MIN: CPT | Mod: 25

## 2020-06-17 PROCEDURE — 93000 ELECTROCARDIOGRAM COMPLETE: CPT

## 2020-06-17 NOTE — ASSESSMENT
[FreeTextEntry1] : This is a 75-year-old male whose history has been reviewed above\par \par He has a history of hypertension his blood pressure slightly above goal however he did not take his morning blood pressure medication. He states his blood pressure runs systolic 1:30 at home no intervention\par \par He has diffuse muscle pain. However I think the majority of his pain his low back he is using over-the-counter lidocaine with good result.\par \par He has a history of hypercholesterolemia and ASHD he remains on Crestor. CPK has been obtained. 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

## 2020-06-17 NOTE — PHYSICAL EXAM
[No JVD] : no jugular venous distention [Normal] : normal rate, regular rhythm, normal S1 and S2 and no murmur heard [No Edema] : there was no peripheral edema [No HSM] : no HSM [No Focal Deficits] : no focal deficits [de-identified] : nchanged [de-identified] : mild difficulty with ambulation secondary to back [de-identified] : a bit overweight

## 2020-06-17 NOTE — REVIEW OF SYSTEMS
[Joint Pain] : joint pain [Muscle Pain] : muscle pain [Back Pain] : back pain [Negative] : Respiratory [de-identified] : stable [de-identified] : stable

## 2020-06-17 NOTE — HISTORY OF PRESENT ILLNESS
[FreeTextEntry1] : This is a 75-year-old male for evaluation and treatment of his ASHD hypertension hypercholesterolemia mild obstructive sleep apnea gout and change in mental status [de-identified] : Patient essentially remains in his usual state of health. He is somewhat more belligerent.\par \par He denies any cardiovascular complaints\par \par He is complaining of diffuse muscle aches but mostly low back pain radiating down his legs.

## 2020-06-18 LAB
25(OH)D3 SERPL-MCNC: 39.4 NG/ML
ALBUMIN SERPL ELPH-MCNC: 4.2 G/DL
ALP BLD-CCNC: 78 U/L
ALT SERPL-CCNC: 14 U/L
ANION GAP SERPL CALC-SCNC: 12 MMOL/L
APPEARANCE: CLEAR
AST SERPL-CCNC: 19 U/L
BASOPHILS # BLD AUTO: 0.05 K/UL
BASOPHILS NFR BLD AUTO: 0.6 %
BILIRUB SERPL-MCNC: 0.4 MG/DL
BILIRUBIN URINE: NEGATIVE
BLOOD URINE: NEGATIVE
BUN SERPL-MCNC: 23 MG/DL
CALCIUM SERPL-MCNC: 9.7 MG/DL
CHLORIDE SERPL-SCNC: 97 MMOL/L
CHOLEST SERPL-MCNC: 98 MG/DL
CHOLEST/HDLC SERPL: 3.4 RATIO
CK SERPL-CCNC: 125 U/L
CO2 SERPL-SCNC: 26 MMOL/L
COLOR: NORMAL
CREAT SERPL-MCNC: 1.33 MG/DL
EOSINOPHIL # BLD AUTO: 0.22 K/UL
EOSINOPHIL NFR BLD AUTO: 2.5 %
GGT SERPL-CCNC: 28 U/L
GLUCOSE QUALITATIVE U: NEGATIVE
GLUCOSE SERPL-MCNC: 86 MG/DL
HCT VFR BLD CALC: 45.7 %
HCV AB SER QL: NONREACTIVE
HCV S/CO RATIO: 0.17 S/CO
HDLC SERPL-MCNC: 29 MG/DL
HGB BLD-MCNC: 15.5 G/DL
IMM GRANULOCYTES NFR BLD AUTO: 0.6 %
KETONES URINE: NEGATIVE
LDLC SERPL CALC-MCNC: 45 MG/DL
LEUKOCYTE ESTERASE URINE: NEGATIVE
LYMPHOCYTES # BLD AUTO: 2.28 K/UL
LYMPHOCYTES NFR BLD AUTO: 25.5 %
MAGNESIUM SERPL-MCNC: 1.9 MG/DL
MAN DIFF?: NORMAL
MCHC RBC-ENTMCNC: 33.3 PG
MCHC RBC-ENTMCNC: 33.9 GM/DL
MCV RBC AUTO: 98.3 FL
MONOCYTES # BLD AUTO: 0.78 K/UL
MONOCYTES NFR BLD AUTO: 8.7 %
NEUTROPHILS # BLD AUTO: 5.56 K/UL
NEUTROPHILS NFR BLD AUTO: 62.1 %
NITRITE URINE: NEGATIVE
PH URINE: 6.5
PLATELET # BLD AUTO: 161 K/UL
POTASSIUM SERPL-SCNC: 3.9 MMOL/L
PROT SERPL-MCNC: 7 G/DL
PROTEIN URINE: NEGATIVE
PSA SERPL-MCNC: 0.84 NG/ML
RBC # BLD: 4.65 M/UL
RBC # FLD: 14.5 %
SAVE SPECIMEN: NORMAL
SODIUM SERPL-SCNC: 135 MMOL/L
SPECIFIC GRAVITY URINE: 1.01
T3RU NFR SERPL: 0.8 TBI
T4 SERPL-MCNC: 6.6 UG/DL
TRIGL SERPL-MCNC: 120 MG/DL
TSH SERPL-ACNC: 2.34 UIU/ML
URATE SERPL-MCNC: 5.9 MG/DL
UROBILINOGEN URINE: NORMAL
WBC # FLD AUTO: 8.94 K/UL

## 2020-06-20 ENCOUNTER — TRANSCRIPTION ENCOUNTER (OUTPATIENT)
Age: 75
End: 2020-06-20

## 2020-10-13 ENCOUNTER — APPOINTMENT (OUTPATIENT)
Dept: INTERNAL MEDICINE | Facility: CLINIC | Age: 75
End: 2020-10-13
Payer: MEDICARE

## 2020-10-13 ENCOUNTER — LABORATORY RESULT (OUTPATIENT)
Age: 75
End: 2020-10-13

## 2020-10-13 ENCOUNTER — NON-APPOINTMENT (OUTPATIENT)
Age: 75
End: 2020-10-13

## 2020-10-13 VITALS
SYSTOLIC BLOOD PRESSURE: 140 MMHG | TEMPERATURE: 97.6 F | DIASTOLIC BLOOD PRESSURE: 80 MMHG | BODY MASS INDEX: 27.63 KG/M2 | WEIGHT: 193 LBS | HEIGHT: 70 IN

## 2020-10-13 PROCEDURE — 93000 ELECTROCARDIOGRAM COMPLETE: CPT

## 2020-10-13 PROCEDURE — 99214 OFFICE O/P EST MOD 30 MIN: CPT | Mod: 25

## 2020-10-13 PROCEDURE — 36415 COLL VENOUS BLD VENIPUNCTURE: CPT

## 2020-10-13 NOTE — HISTORY OF PRESENT ILLNESS
[FreeTextEntry1] : This is a 75-year-old male for evaluation and treatment of his hypertension hypercholesterolemia ASHD gout and mild cognitive impairment [de-identified] : Patient remains in his usual state of health he denies any cardiovascular complaints or gouty attacks. He is having an exacerbation of his chronic intermittent low back pain

## 2020-10-13 NOTE — PHYSICAL EXAM
[No JVD] : no jugular venous distention [Normal] : normal rate, regular rhythm, normal S1 and S2 and no murmur heard [No Edema] : there was no peripheral edema [No CVA Tenderness] : no CVA  tenderness [No Spinal Tenderness] : no spinal tenderness [No Focal Deficits] : no focal deficits [de-identified] : difficulty with rotation and movement [de-identified] : essentially stable

## 2020-10-14 LAB
25(OH)D3 SERPL-MCNC: 37.3 NG/ML
ALBUMIN SERPL ELPH-MCNC: 4.2 G/DL
ALP BLD-CCNC: 83 U/L
ALT SERPL-CCNC: 20 U/L
ANION GAP SERPL CALC-SCNC: 11 MMOL/L
APPEARANCE: CLEAR
AST SERPL-CCNC: 21 U/L
BASOPHILS # BLD AUTO: 0.05 K/UL
BASOPHILS NFR BLD AUTO: 0.5 %
BILIRUB SERPL-MCNC: 0.4 MG/DL
BILIRUBIN URINE: NEGATIVE
BLOOD URINE: NEGATIVE
BUN SERPL-MCNC: 18 MG/DL
CALCIUM SERPL-MCNC: 9.8 MG/DL
CHLORIDE SERPL-SCNC: 98 MMOL/L
CHOLEST SERPL-MCNC: 100 MG/DL
CHOLEST/HDLC SERPL: 2.9 RATIO
CK SERPL-CCNC: 118 U/L
CO2 SERPL-SCNC: 28 MMOL/L
COLOR: NORMAL
CREAT SERPL-MCNC: 1.26 MG/DL
CRP SERPL HS-MCNC: 1.7 MG/L
CRP SERPL-MCNC: 0.18 MG/DL
EOSINOPHIL # BLD AUTO: 0.23 K/UL
EOSINOPHIL NFR BLD AUTO: 2.5 %
ESTIMATED AVERAGE GLUCOSE: 131 MG/DL
GGT SERPL-CCNC: 26 U/L
GLUCOSE QUALITATIVE U: NEGATIVE
GLUCOSE SERPL-MCNC: 86 MG/DL
HBA1C MFR BLD HPLC: 6.2 %
HCT VFR BLD CALC: 45.2 %
HCV AB SER QL: NONREACTIVE
HCV S/CO RATIO: 0.15 S/CO
HDLC SERPL-MCNC: 35 MG/DL
HGB BLD-MCNC: 15.2 G/DL
IMM GRANULOCYTES NFR BLD AUTO: 0.4 %
KETONES URINE: NEGATIVE
LDLC SERPL CALC-MCNC: 40 MG/DL
LEUKOCYTE ESTERASE URINE: NEGATIVE
LYMPHOCYTES # BLD AUTO: 2.17 K/UL
LYMPHOCYTES NFR BLD AUTO: 23.7 %
MAN DIFF?: NORMAL
MCHC RBC-ENTMCNC: 33.6 GM/DL
MCHC RBC-ENTMCNC: 33.6 PG
MCV RBC AUTO: 100 FL
MONOCYTES # BLD AUTO: 0.87 K/UL
MONOCYTES NFR BLD AUTO: 9.5 %
NEUTROPHILS # BLD AUTO: 5.78 K/UL
NEUTROPHILS NFR BLD AUTO: 63.4 %
NITRITE URINE: NEGATIVE
PH URINE: 6.5
PLATELET # BLD AUTO: 158 K/UL
POTASSIUM SERPL-SCNC: 3.9 MMOL/L
PROT SERPL-MCNC: 6.8 G/DL
PROTEIN URINE: NORMAL
PSA SERPL-MCNC: 0.61 NG/ML
RBC # BLD: 4.52 M/UL
RBC # FLD: 14.8 %
SODIUM SERPL-SCNC: 138 MMOL/L
SPECIFIC GRAVITY URINE: 1.01
T3RU NFR SERPL: 0.8 TBI
T4 SERPL-MCNC: 6.7 UG/DL
TRIGL SERPL-MCNC: 122 MG/DL
TROPONIN I SERPL-MCNC: 0.01 NG/ML
TSH SERPL-ACNC: 3.03 UIU/ML
URATE SERPL-MCNC: 5.6 MG/DL
UROBILINOGEN URINE: NORMAL
WBC # FLD AUTO: 9.14 K/UL

## 2021-02-16 ENCOUNTER — LABORATORY RESULT (OUTPATIENT)
Age: 76
End: 2021-02-16

## 2021-02-16 ENCOUNTER — APPOINTMENT (OUTPATIENT)
Dept: INTERNAL MEDICINE | Facility: CLINIC | Age: 76
End: 2021-02-16
Payer: MEDICARE

## 2021-02-16 ENCOUNTER — NON-APPOINTMENT (OUTPATIENT)
Age: 76
End: 2021-02-16

## 2021-02-16 VITALS
SYSTOLIC BLOOD PRESSURE: 138 MMHG | DIASTOLIC BLOOD PRESSURE: 80 MMHG | HEIGHT: 70 IN | WEIGHT: 187 LBS | BODY MASS INDEX: 26.77 KG/M2 | TEMPERATURE: 97.6 F

## 2021-02-16 PROCEDURE — 36415 COLL VENOUS BLD VENIPUNCTURE: CPT

## 2021-02-16 PROCEDURE — 93000 ELECTROCARDIOGRAM COMPLETE: CPT

## 2021-02-16 PROCEDURE — 99214 OFFICE O/P EST MOD 30 MIN: CPT | Mod: 25

## 2021-02-16 NOTE — ASSESSMENT
[FreeTextEntry1] : This is a 75-year-old male whose history has been reviewed above\par \par He has a history of hypertension his blood pressure is under good control no orthostasis of significance no medication changes\par \par He has a history of ASHD he does follow-up with cardiology he has had no cardiac symptoms cardiogram and cardiac exam unchanged no intervention\par \par He has a history of hypercholesterolemia he remains on a statin a cholesterol profile has been obtained medication changes predicated on the results\par \par \par He seems to have benefited somewhat from his SSRI we will continue the medication he now seems to accept it appropriate blood tests have been drawn no further intervention\par \par He has a history of gout he remains on allopurinol he has had no gouty episodes\par \par He does not seem to have any focal neurologic deficit from his back.  He is able to control his pain he does not have any pain at rest.  I think he will be deferring surgery at this point we will try to obtain a copy of his special surgery consultation\par \par \par

## 2021-02-16 NOTE — HISTORY OF PRESENT ILLNESS
[FreeTextEntry1] : This is a 75-year-old male for evaluation and treatment of his coronary artery disease depression hypertension hypercholesterolemia note and back pain [de-identified] : Patient's primary complaint is back pain he has no cardiovascular complaints he has lost 10 pounds and is concerned about fatigue

## 2021-02-16 NOTE — REVIEW OF SYSTEMS
[Fatigue] : fatigue [Back Pain] : back pain [Depression] : depression [Negative] : Respiratory [de-identified] : Difficulty with gait

## 2021-02-16 NOTE — PHYSICAL EXAM
[No JVD] : no jugular venous distention [Normal] : normal rate, regular rhythm, normal S1 and S2 and no murmur heard [No Edema] : there was no peripheral edema [No Focal Deficits] : no focal deficits [de-identified] : Mental status seems stable  [de-identified] : Bit less agitated

## 2021-02-17 LAB
25(OH)D3 SERPL-MCNC: 41.5 NG/ML
ALBUMIN SERPL ELPH-MCNC: 4.3 G/DL
ALP BLD-CCNC: 98 U/L
ALT SERPL-CCNC: 14 U/L
ANION GAP SERPL CALC-SCNC: 14 MMOL/L
AST SERPL-CCNC: 19 U/L
BASOPHILS # BLD AUTO: 0.05 K/UL
BASOPHILS NFR BLD AUTO: 0.6 %
BILIRUB SERPL-MCNC: 0.4 MG/DL
BUN SERPL-MCNC: 19 MG/DL
CALCIUM SERPL-MCNC: 9.9 MG/DL
CHLORIDE SERPL-SCNC: 98 MMOL/L
CHOLEST SERPL-MCNC: 113 MG/DL
CK SERPL-CCNC: 83 U/L
CO2 SERPL-SCNC: 25 MMOL/L
CREAT SERPL-MCNC: 1.33 MG/DL
EOSINOPHIL # BLD AUTO: 0.2 K/UL
EOSINOPHIL NFR BLD AUTO: 2.2 %
ESTIMATED AVERAGE GLUCOSE: 131 MG/DL
GGT SERPL-CCNC: 27 U/L
GLUCOSE SERPL-MCNC: 83 MG/DL
HBA1C MFR BLD HPLC: 6.2 %
HCT VFR BLD CALC: 45.2 %
HDLC SERPL-MCNC: 36 MG/DL
HGB BLD-MCNC: 15.1 G/DL
IMM GRANULOCYTES NFR BLD AUTO: 0.6 %
LDLC SERPL CALC-MCNC: 51 MG/DL
LYMPHOCYTES # BLD AUTO: 2.06 K/UL
LYMPHOCYTES NFR BLD AUTO: 23 %
MAGNESIUM SERPL-MCNC: 2 MG/DL
MAN DIFF?: NORMAL
MCHC RBC-ENTMCNC: 33.4 GM/DL
MCHC RBC-ENTMCNC: 33.8 PG
MCV RBC AUTO: 101.1 FL
MONOCYTES # BLD AUTO: 0.91 K/UL
MONOCYTES NFR BLD AUTO: 10.2 %
NEUTROPHILS # BLD AUTO: 5.67 K/UL
NEUTROPHILS NFR BLD AUTO: 63.4 %
NONHDLC SERPL-MCNC: 77 MG/DL
PLATELET # BLD AUTO: 152 K/UL
POTASSIUM SERPL-SCNC: 4.2 MMOL/L
PROT SERPL-MCNC: 6.9 G/DL
RBC # BLD: 4.47 M/UL
RBC # FLD: 15.1 %
SAVE SPECIMEN: NORMAL
SODIUM SERPL-SCNC: 138 MMOL/L
T3RU NFR SERPL: 0.8 TBI
T4 SERPL-MCNC: 6.7 UG/DL
TRIGL SERPL-MCNC: 130 MG/DL
TROPONIN I SERPL-MCNC: 0.02 NG/ML
TSH SERPL-ACNC: 2.56 UIU/ML
URATE SERPL-MCNC: 5.5 MG/DL
WBC # FLD AUTO: 8.94 K/UL

## 2021-02-18 DIAGNOSIS — R19.5 OTHER FECAL ABNORMALITIES: ICD-10-CM

## 2021-02-18 DIAGNOSIS — Z12.11 ENCOUNTER FOR SCREENING FOR MALIGNANT NEOPLASM OF COLON: ICD-10-CM

## 2021-02-27 DIAGNOSIS — Z01.818 ENCOUNTER FOR OTHER PREPROCEDURAL EXAMINATION: ICD-10-CM

## 2021-03-01 ENCOUNTER — APPOINTMENT (OUTPATIENT)
Dept: DISASTER EMERGENCY | Facility: CLINIC | Age: 76
End: 2021-03-01

## 2021-03-02 LAB — SARS-COV-2 N GENE NPH QL NAA+PROBE: NOT DETECTED

## 2021-03-04 ENCOUNTER — RESULT REVIEW (OUTPATIENT)
Age: 76
End: 2021-03-04

## 2021-03-04 ENCOUNTER — OUTPATIENT (OUTPATIENT)
Dept: OUTPATIENT SERVICES | Facility: HOSPITAL | Age: 76
LOS: 1 days | Discharge: HOME | End: 2021-03-04
Payer: MEDICARE

## 2021-03-04 ENCOUNTER — APPOINTMENT (OUTPATIENT)
Dept: GASTROENTEROLOGY | Facility: HOSPITAL | Age: 76
End: 2021-03-04

## 2021-03-04 VITALS
HEART RATE: 58 BPM | WEIGHT: 186.95 LBS | OXYGEN SATURATION: 96 % | DIASTOLIC BLOOD PRESSURE: 76 MMHG | RESPIRATION RATE: 14 BRPM | SYSTOLIC BLOOD PRESSURE: 143 MMHG | TEMPERATURE: 98 F | HEIGHT: 70 IN

## 2021-03-04 VITALS
HEART RATE: 62 BPM | RESPIRATION RATE: 17 BRPM | OXYGEN SATURATION: 99 % | DIASTOLIC BLOOD PRESSURE: 62 MMHG | SYSTOLIC BLOOD PRESSURE: 136 MMHG

## 2021-03-04 DIAGNOSIS — Z12.11 ENCOUNTER FOR SCREENING FOR MALIGNANT NEOPLASM OF COLON: ICD-10-CM

## 2021-03-04 PROCEDURE — 88305 TISSUE EXAM BY PATHOLOGIST: CPT | Mod: 26

## 2021-03-04 PROCEDURE — 45380 COLONOSCOPY AND BIOPSY: CPT

## 2021-03-04 NOTE — ASU PREOP CHECKLIST - LOOSE TEETH
Other (Free Text): Recommended that patient Avoid topical products (make-up, creams), apply medication during the day and Vaseline at night.
Detail Level: Simple
Note Text (......Xxx Chief Complaint.): This diagnosis correlates with the
no

## 2021-03-08 ENCOUNTER — NON-APPOINTMENT (OUTPATIENT)
Age: 76
End: 2021-03-08

## 2021-03-08 LAB — SURGICAL PATHOLOGY STUDY: SIGNIFICANT CHANGE UP

## 2021-04-06 DIAGNOSIS — Z86.018 PERSONAL HISTORY OF OTHER BENIGN NEOPLASM: ICD-10-CM

## 2021-06-15 ENCOUNTER — LABORATORY RESULT (OUTPATIENT)
Age: 76
End: 2021-06-15

## 2021-06-15 ENCOUNTER — APPOINTMENT (OUTPATIENT)
Dept: INTERNAL MEDICINE | Facility: CLINIC | Age: 76
End: 2021-06-15
Payer: MEDICARE

## 2021-06-15 ENCOUNTER — NON-APPOINTMENT (OUTPATIENT)
Age: 76
End: 2021-06-15

## 2021-06-15 VITALS
SYSTOLIC BLOOD PRESSURE: 128 MMHG | WEIGHT: 186 LBS | DIASTOLIC BLOOD PRESSURE: 70 MMHG | HEIGHT: 70.5 IN | BODY MASS INDEX: 26.33 KG/M2 | TEMPERATURE: 98.2 F

## 2021-06-15 PROBLEM — I10 ESSENTIAL (PRIMARY) HYPERTENSION: Chronic | Status: ACTIVE | Noted: 2021-03-04

## 2021-06-15 PROBLEM — E78.5 HYPERLIPIDEMIA, UNSPECIFIED: Chronic | Status: ACTIVE | Noted: 2021-03-04

## 2021-06-15 LAB
25(OH)D3 SERPL-MCNC: 39.7 NG/ML
ALBUMIN SERPL ELPH-MCNC: 4.1 G/DL
ALP BLD-CCNC: 91 U/L
ALT SERPL-CCNC: 19 U/L
ANION GAP SERPL CALC-SCNC: 14 MMOL/L
AST SERPL-CCNC: 20 U/L
BASOPHILS # BLD AUTO: 0.04 K/UL
BASOPHILS NFR BLD AUTO: 0.4 %
BILIRUB SERPL-MCNC: 0.5 MG/DL
BILIRUB UR QL STRIP: NORMAL
BUN SERPL-MCNC: 27 MG/DL
CALCIUM SERPL-MCNC: 9.8 MG/DL
CHLORIDE SERPL-SCNC: 98 MMOL/L
CHOLEST SERPL-MCNC: 93 MG/DL
CK SERPL-CCNC: 89 U/L
CLARITY UR: CLEAR
CO2 SERPL-SCNC: 26 MMOL/L
COLLECTION METHOD: NORMAL
CREAT SERPL-MCNC: 1.41 MG/DL
EOSINOPHIL # BLD AUTO: 0.18 K/UL
EOSINOPHIL NFR BLD AUTO: 1.7 %
ESTIMATED AVERAGE GLUCOSE: 120 MG/DL
GGT SERPL-CCNC: 24 U/L
GLUCOSE SERPL-MCNC: 123 MG/DL
GLUCOSE UR-MCNC: NORMAL
HBA1C MFR BLD HPLC: 5.8 %
HCG UR QL: 0.2 EU/DL
HCT VFR BLD CALC: 43.3 %
HDLC SERPL-MCNC: 30 MG/DL
HGB BLD-MCNC: 15 G/DL
HGB UR QL STRIP.AUTO: NORMAL
IMM GRANULOCYTES NFR BLD AUTO: 0.4 %
KETONES UR-MCNC: NORMAL
LDLC SERPL CALC-MCNC: 42 MG/DL
LEUKOCYTE ESTERASE UR QL STRIP: NORMAL
LYMPHOCYTES # BLD AUTO: 1.84 K/UL
LYMPHOCYTES NFR BLD AUTO: 17.3 %
MAGNESIUM SERPL-MCNC: 2.1 MG/DL
MAN DIFF?: NORMAL
MCHC RBC-ENTMCNC: 33.6 PG
MCHC RBC-ENTMCNC: 34.6 GM/DL
MCV RBC AUTO: 96.9 FL
MONOCYTES # BLD AUTO: 0.97 K/UL
MONOCYTES NFR BLD AUTO: 9.1 %
NEUTROPHILS # BLD AUTO: 7.57 K/UL
NEUTROPHILS NFR BLD AUTO: 71.1 %
NITRITE UR QL STRIP: NORMAL
NONHDLC SERPL-MCNC: 62 MG/DL
PH UR STRIP: 6
PLATELET # BLD AUTO: 137 K/UL
POTASSIUM SERPL-SCNC: 3.7 MMOL/L
PROT SERPL-MCNC: 6.6 G/DL
PROT UR STRIP-MCNC: NORMAL
PSA FREE FLD-MCNC: 45 %
PSA FREE SERPL-MCNC: 0.27 NG/ML
PSA SERPL-MCNC: 0.61 NG/ML
RBC # BLD: 4.47 M/UL
RBC # FLD: 13.3 %
SODIUM SERPL-SCNC: 137 MMOL/L
SP GR UR STRIP: 1.02
T3RU NFR SERPL: 0.8 TBI
T4 SERPL-MCNC: 6.3 UG/DL
TRIGL SERPL-MCNC: 102 MG/DL
TSH SERPL-ACNC: 2.56 UIU/ML
URATE SERPL-MCNC: 5.9 MG/DL
WBC # FLD AUTO: 10.64 K/UL

## 2021-06-15 PROCEDURE — 99214 OFFICE O/P EST MOD 30 MIN: CPT | Mod: 25

## 2021-06-15 PROCEDURE — 36415 COLL VENOUS BLD VENIPUNCTURE: CPT

## 2021-06-15 PROCEDURE — 93000 ELECTROCARDIOGRAM COMPLETE: CPT

## 2021-06-15 PROCEDURE — 81003 URINALYSIS AUTO W/O SCOPE: CPT | Mod: QW

## 2021-06-15 RX ORDER — BACLOFEN 5 MG/1
5 TABLET ORAL
Qty: 30 | Refills: 0 | Status: DISCONTINUED | COMMUNITY
Start: 2019-04-19 | End: 2021-06-15

## 2021-06-15 RX ORDER — SODIUM SULFATE, POTASSIUM SULFATE, MAGNESIUM SULFATE 17.5; 3.13; 1.6 G/ML; G/ML; G/ML
17.5-3.13-1.6 SOLUTION, CONCENTRATE ORAL
Qty: 1 | Refills: 0 | Status: DISCONTINUED | COMMUNITY
Start: 2021-02-18 | End: 2021-06-15

## 2021-06-15 NOTE — ASSESSMENT
[FreeTextEntry1] : Is a 76-year-old male whose history has been reviewed above\par \par He has a history of hypertension his blood pressure is under excellent control he has no orthostasis he has lost 15 pounds\par \par He has continued personality and some cognitive decline in addition he is complaining of insomnia I will start him on Ambien we will continue his SSRI appropriate blood tests have been drawn\par \par His EKG now shows a right bundle branch block he had an incomplete right bundle in the past I will refer him back to cardiology\par \par He has a history of hypercholesterolemia remains on a statin a cholesterol profile has been obtained medication changes predicated on the results\par \par He has a history of gout he remains on allopurinol has no further episodes uric acid was obtained

## 2021-06-15 NOTE — HISTORY OF PRESENT ILLNESS
[FreeTextEntry1] : She is a 76-year-old male for evaluation and treatment of his hypertension hypercholesterolemia coronary artery disease obstructive sleep apnea personality and gout [de-identified] : Patient has no cardiovascular complaints.  He did have an episode of severe back pain but this is resolved his primary complaint is insomnia

## 2021-06-15 NOTE — PHYSICAL EXAM
[No JVD] : no jugular venous distention [Normal] : normal rate, regular rhythm, normal S1 and S2 and no murmur heard [No Focal Deficits] : no focal deficits [de-identified] : Mild decline

## 2021-10-06 ENCOUNTER — NON-APPOINTMENT (OUTPATIENT)
Age: 76
End: 2021-10-06

## 2021-10-06 ENCOUNTER — APPOINTMENT (OUTPATIENT)
Dept: CARDIOLOGY | Facility: CLINIC | Age: 76
End: 2021-10-06
Payer: MEDICARE

## 2021-10-06 VITALS
HEART RATE: 54 BPM | WEIGHT: 190 LBS | BODY MASS INDEX: 26.88 KG/M2 | SYSTOLIC BLOOD PRESSURE: 120 MMHG | DIASTOLIC BLOOD PRESSURE: 60 MMHG | OXYGEN SATURATION: 95 %

## 2021-10-06 PROCEDURE — 93000 ELECTROCARDIOGRAM COMPLETE: CPT

## 2021-10-06 PROCEDURE — 99204 OFFICE O/P NEW MOD 45 MIN: CPT

## 2021-10-06 PROCEDURE — 99214 OFFICE O/P EST MOD 30 MIN: CPT

## 2021-10-07 NOTE — ASSESSMENT
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  His blood pressure is well controlled and his EKG is stable.  He does have some degree of a cognitive disorder and decreased hearing as well.  The wife notes this and is of great concern to her.  He probably has some form of an early dementia but overall he is functioning normally and is free of any cardiac symptoms.\par \par 1.  Hypertension well-controlled\par 2.  Chronic ischemic heart disease no active angina previous normal noninvasive work-up\par 3.  Cognitive disorder perhaps progressive\par 4.  Presently no active cardiac issues

## 2021-10-07 NOTE — HISTORY OF PRESENT ILLNESS
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  He is status post remote catheterization and PTCA without stent in the early 90s.  Last catheterization in 1999 coronaries were patent\par \par He has been quite stable since then with no significant cardiac issues. \par I saw him over a year ago and he had a stress echocardiogram which was unremarkable full report will follow from my previous [facility] office\par \par He is followed carefully by Dr. Griffith\par His last EKG revealed normal sinus rhythm with a right bundle branch block but the EKG today is normal former EKGs revealed normal sinus rhythm with a left axis deviation otherwise within normal limits\par \par He has reasonable exercise tolerance no chest pain no palpitations no dizziness or syncope no edema no orthopnea or PND\par \par He remains on stable medication\par Hemoglobin A1c 5.8 PSA 0.61\par Lipid panel HDL 30 cholesterol 93 triglycerides 102 LDL 42 non-HDL 62

## 2021-10-07 NOTE — REASON FOR VISIT
[FreeTextEntry1] : Jaskaran Worthingtonnisreen  76 years old here for evaluation of his cardiac status.  I had previously seen him in my old [facility] office

## 2021-10-07 NOTE — DISCUSSION/SUMMARY
[FreeTextEntry1] : The patient will continue on his present regimen of medications.  I will obtain records from my prior office.  I encouraged him to be active and exercise.  Perhaps neurologic evaluation should be considered in light of his cognitive disorder.\par \par Routine follow with me again in 6 months

## 2021-10-26 ENCOUNTER — APPOINTMENT (OUTPATIENT)
Dept: INTERNAL MEDICINE | Facility: CLINIC | Age: 76
End: 2021-10-26
Payer: MEDICARE

## 2021-10-26 ENCOUNTER — LABORATORY RESULT (OUTPATIENT)
Age: 76
End: 2021-10-26

## 2021-10-26 VITALS
HEIGHT: 70.5 IN | WEIGHT: 185 LBS | SYSTOLIC BLOOD PRESSURE: 148 MMHG | DIASTOLIC BLOOD PRESSURE: 82 MMHG | BODY MASS INDEX: 26.19 KG/M2

## 2021-10-26 DIAGNOSIS — G47.00 INSOMNIA, UNSPECIFIED: ICD-10-CM

## 2021-10-26 LAB
BILIRUB UR QL STRIP: NORMAL
CLARITY UR: CLEAR
COLLECTION METHOD: NORMAL
GLUCOSE UR-MCNC: NORMAL
HCG UR QL: 0.2 EU/DL
HGB UR QL STRIP.AUTO: NORMAL
KETONES UR-MCNC: NORMAL
LEUKOCYTE ESTERASE UR QL STRIP: NORMAL
NITRITE UR QL STRIP: NORMAL
PH UR STRIP: 7
PROT UR STRIP-MCNC: NORMAL
SP GR UR STRIP: 1.01

## 2021-10-26 PROCEDURE — 81003 URINALYSIS AUTO W/O SCOPE: CPT | Mod: QW

## 2021-10-26 PROCEDURE — 99214 OFFICE O/P EST MOD 30 MIN: CPT | Mod: 25

## 2021-10-26 PROCEDURE — 36415 COLL VENOUS BLD VENIPUNCTURE: CPT

## 2021-10-26 PROCEDURE — G0439: CPT

## 2021-10-26 NOTE — ASSESSMENT
[FreeTextEntry1] : This is a 76-year-old male whose history has been reviewed above\par \par He has a history of hypertension his blood pressure is somewhat above goal but manageable.  He has recently been seen by cardiology where his blood pressures were better.  He does even out at 138/78 no medication changes\par \par He has a history of ASHD again seen by cardiology EKG unchanged he had a PTCA with no stent in the early 90s and had a subsequent cath in 1999 with patent coronaries.  He has no chest pain his cardiac exam is noted to be unchanged no intervention\par \par He has a history of hypercholesterolemia he remains on a statin a cholesterol profile has been obtained medication changes predicated on the results\par \par He had a history of low back pain he is doing exercises and states that his back pain has completely resolved.  However he did have some difficulty and needed assistance in getting off the table.  At this point no intervention\par \par He is having considerable difficulty with insomnia I will start him on Ambien\par \par He did have a colonoscopy in 2008 and a CT of the chest and abdomen in 2019 which were negative he is up-to-date with vaccinations\par \par He has cognitive difficulties I did a B12 level he also has mild depression and is on an SSRI with good results we will continue the medications appropriate blood tests have been drawn\par \par Still gout he has had no flares we will continue allopurinol uric acid has been obtained

## 2021-10-26 NOTE — HEALTH RISK ASSESSMENT
[Good] : ~his/her~  mood as  good [Yes] : Yes [No falls in past year] : Patient reported no falls in the past year [Change in mental status noted] : Change in mental status noted [None] : None [With Significant Other] : lives with significant other [Retired] : retired [Graduate School] : graduate school [] :  [Fully functional (bathing, dressing, toileting, transferring, walking, feeding)] : Fully functional (bathing, dressing, toileting, transferring, walking, feeding) [Fully functional (using the telephone, shopping, preparing meals, housekeeping, doing laundry, using] : Fully functional and needs no help or supervision to perform IADLs (using the telephone, shopping, preparing meals, housekeeping, doing laundry, using transportation, managing medications and managing finances) [Reports changes in hearing] : Reports changes in hearing [Smoke Detector] : smoke detector [Carbon Monoxide Detector] : carbon monoxide detector [Seat Belt] :  uses seat belt [Sunscreen] : uses sunscreen [I will adhere to the patient's wishes.] : I will adhere to the patient's wishes. [] : No [de-identified] : History [Reports changes in vision] : Reports no changes in vision [Reports changes in dental health] : Reports no changes in dental health [Guns at Home] : no guns at home [Safety elements used in home] : no safety elements used in home [Travel to Developing Areas] : does not  travel to developing areas [TB Exposure] : is not being exposed to tuberculosis [Caregiver Concerns] : does not have caregiver concerns [FreeTextEntry4] : wife his healthcare proxy

## 2021-10-26 NOTE — PHYSICAL EXAM
[No Acute Distress] : no acute distress [Well Nourished] : well nourished [Well Developed] : well developed [Well-Appearing] : well-appearing [Normal Sclera/Conjunctiva] : normal sclera/conjunctiva [PERRL] : pupils equal round and reactive to light [EOMI] : extraocular movements intact [Normal Outer Ear/Nose] : the outer ears and nose were normal in appearance [Normal Oropharynx] : the oropharynx was normal [Normal TMs] : both tympanic membranes were normal [No JVD] : no jugular venous distention [No Lymphadenopathy] : no lymphadenopathy [Supple] : supple [Thyroid Normal, No Nodules] : the thyroid was normal and there were no nodules present [No Respiratory Distress] : no respiratory distress  [No Accessory Muscle Use] : no accessory muscle use [Clear to Auscultation] : lungs were clear to auscultation bilaterally [Normal Rate] : normal rate  [Regular Rhythm] : with a regular rhythm [Normal S1, S2] : normal S1 and S2 [No Murmur] : no murmur heard [No Carotid Bruits] : no carotid bruits [No Abdominal Bruit] : a ~M bruit was not heard ~T in the abdomen [No Varicosities] : no varicosities [Pedal Pulses Present] : the pedal pulses are present [No Edema] : there was no peripheral edema [No Palpable Aorta] : no palpable aorta [No Extremity Clubbing/Cyanosis] : no extremity clubbing/cyanosis [Soft] : abdomen soft [Non Tender] : non-tender [Non-distended] : non-distended [No Masses] : no abdominal mass palpated [No HSM] : no HSM [Normal Bowel Sounds] : normal bowel sounds [Normal Posterior Cervical Nodes] : no posterior cervical lymphadenopathy [Normal Anterior Cervical Nodes] : no anterior cervical lymphadenopathy [No CVA Tenderness] : no CVA  tenderness [No Spinal Tenderness] : no spinal tenderness [No Joint Swelling] : no joint swelling [Grossly Normal Strength/Tone] : grossly normal strength/tone [No Rash] : no rash [Coordination Grossly Intact] : coordination grossly intact [No Focal Deficits] : no focal deficits [Normal Gait] : normal gait [Deep Tendon Reflexes (DTR)] : deep tendon reflexes were 2+ and symmetric [Normal Affect] : the affect was normal [Normal Insight/Judgement] : insight and judgment were intact [de-identified] : Rhinorrhea

## 2021-10-26 NOTE — HISTORY OF PRESENT ILLNESS
[FreeTextEntry1] : Is a 76-year-old male for annual health assessment.  Specifically we will address his history of hypertension hypercholesterolemia ASHD mild obstructive sleep disorder gout.  In addition he has had some cognitive and behavioral difficulties [de-identified] : Patient's complaints or hearing insomnia nocturia\par \par He has obvious hearing loss and cognitive decline\par \par He has had a recent eye examination which was excellent but he states he does not see quite the way he used

## 2021-10-27 LAB
25(OH)D3 SERPL-MCNC: 39.2 NG/ML
ALBUMIN SERPL ELPH-MCNC: 4.5 G/DL
ALP BLD-CCNC: 96 U/L
ALT SERPL-CCNC: 18 U/L
ANION GAP SERPL CALC-SCNC: 15 MMOL/L
AST SERPL-CCNC: 19 U/L
BASOPHILS # BLD AUTO: 0.05 K/UL
BASOPHILS NFR BLD AUTO: 0.6 %
BILIRUB SERPL-MCNC: 0.5 MG/DL
BUN SERPL-MCNC: 20 MG/DL
CALCIUM SERPL-MCNC: 10.2 MG/DL
CHLORIDE SERPL-SCNC: 100 MMOL/L
CHOLEST SERPL-MCNC: 106 MG/DL
CK SERPL-CCNC: 78 U/L
CO2 SERPL-SCNC: 26 MMOL/L
CREAT SERPL-MCNC: 1.43 MG/DL
CRP SERPL HS-MCNC: 1.96 MG/L
CRP SERPL-MCNC: <3 MG/L
EOSINOPHIL # BLD AUTO: 0.15 K/UL
EOSINOPHIL NFR BLD AUTO: 1.9 %
ESTIMATED AVERAGE GLUCOSE: 126 MG/DL
GGT SERPL-CCNC: 28 U/L
GLUCOSE SERPL-MCNC: 91 MG/DL
HBA1C MFR BLD HPLC: 6 %
HCT VFR BLD CALC: 46 %
HDLC SERPL-MCNC: 35 MG/DL
HGB BLD-MCNC: 15.7 G/DL
IMM GRANULOCYTES NFR BLD AUTO: 0.3 %
LDH SERPL-CCNC: 170 U/L
LDLC SERPL CALC-MCNC: 37 MG/DL
LYMPHOCYTES # BLD AUTO: 2.11 K/UL
LYMPHOCYTES NFR BLD AUTO: 26.6 %
MAGNESIUM SERPL-MCNC: 2.1 MG/DL
MAN DIFF?: NORMAL
MCHC RBC-ENTMCNC: 33.8 PG
MCHC RBC-ENTMCNC: 34.1 GM/DL
MCV RBC AUTO: 98.9 FL
MONOCYTES # BLD AUTO: 0.93 K/UL
MONOCYTES NFR BLD AUTO: 11.7 %
NEUTROPHILS # BLD AUTO: 4.66 K/UL
NEUTROPHILS NFR BLD AUTO: 58.9 %
NONHDLC SERPL-MCNC: 71 MG/DL
PLATELET # BLD AUTO: 149 K/UL
POTASSIUM SERPL-SCNC: 4.2 MMOL/L
PROT SERPL-MCNC: 7.2 G/DL
PSA FREE FLD-MCNC: 46 %
PSA FREE SERPL-MCNC: 0.27 NG/ML
PSA SERPL-MCNC: 0.58 NG/ML
RBC # BLD: 4.65 M/UL
RBC # FLD: 14.8 %
SODIUM SERPL-SCNC: 141 MMOL/L
T3RU NFR SERPL: 0.9 TBI
T4 SERPL-MCNC: 5.9 UG/DL
TRIGL SERPL-MCNC: 170 MG/DL
TSH SERPL-ACNC: 2.49 UIU/ML
URATE SERPL-MCNC: 5.6 MG/DL
VIT B12 SERPL-MCNC: 464 PG/ML
WBC # FLD AUTO: 7.92 K/UL

## 2022-01-05 ENCOUNTER — RX RENEWAL (OUTPATIENT)
Age: 77
End: 2022-01-05

## 2022-03-15 ENCOUNTER — LABORATORY RESULT (OUTPATIENT)
Age: 77
End: 2022-03-15

## 2022-03-15 ENCOUNTER — APPOINTMENT (OUTPATIENT)
Dept: INTERNAL MEDICINE | Facility: CLINIC | Age: 77
End: 2022-03-15
Payer: MEDICARE

## 2022-03-15 ENCOUNTER — NON-APPOINTMENT (OUTPATIENT)
Age: 77
End: 2022-03-15

## 2022-03-15 VITALS
WEIGHT: 186 LBS | DIASTOLIC BLOOD PRESSURE: 80 MMHG | BODY MASS INDEX: 26.33 KG/M2 | HEIGHT: 70.5 IN | SYSTOLIC BLOOD PRESSURE: 138 MMHG

## 2022-03-15 DIAGNOSIS — J84.10 PULMONARY FIBROSIS, UNSPECIFIED: ICD-10-CM

## 2022-03-15 LAB
BILIRUB UR QL STRIP: NORMAL
CLARITY UR: CLEAR
COLLECTION METHOD: NORMAL
GLUCOSE UR-MCNC: NORMAL
HCG UR QL: 0.2 EU/DL
HGB UR QL STRIP.AUTO: NORMAL
KETONES UR-MCNC: NORMAL
LEUKOCYTE ESTERASE UR QL STRIP: NORMAL
NITRITE UR QL STRIP: NORMAL
PH UR STRIP: 7
PROT UR STRIP-MCNC: NORMAL
SP GR UR STRIP: 1.02

## 2022-03-15 PROCEDURE — 99214 OFFICE O/P EST MOD 30 MIN: CPT | Mod: 25

## 2022-03-15 PROCEDURE — 93000 ELECTROCARDIOGRAM COMPLETE: CPT

## 2022-03-15 PROCEDURE — 36415 COLL VENOUS BLD VENIPUNCTURE: CPT

## 2022-03-15 RX ORDER — ZOLPIDEM TARTRATE 5 MG/1
5 TABLET ORAL
Qty: 30 | Refills: 3 | Status: DISCONTINUED | COMMUNITY
Start: 2021-10-26 | End: 2022-03-15

## 2022-03-15 RX ORDER — ALPRAZOLAM 0.25 MG/1
0.25 TABLET ORAL TWICE DAILY
Qty: 60 | Refills: 0 | Status: DISCONTINUED | COMMUNITY
Start: 2019-07-15 | End: 2022-03-15

## 2022-03-16 LAB
25(OH)D3 SERPL-MCNC: 41.2 NG/ML
ALBUMIN SERPL ELPH-MCNC: 4.2 G/DL
ALP BLD-CCNC: 83 U/L
ALT SERPL-CCNC: 19 U/L
ANION GAP SERPL CALC-SCNC: 14 MMOL/L
AST SERPL-CCNC: 21 U/L
BASOPHILS # BLD AUTO: 0.07 K/UL
BASOPHILS NFR BLD AUTO: 0.7 %
BILIRUB SERPL-MCNC: 0.5 MG/DL
BUN SERPL-MCNC: 18 MG/DL
CALCIUM SERPL-MCNC: 9.7 MG/DL
CHLORIDE SERPL-SCNC: 101 MMOL/L
CHOLEST SERPL-MCNC: 97 MG/DL
CK SERPL-CCNC: 79 U/L
CO2 SERPL-SCNC: 24 MMOL/L
CREAT SERPL-MCNC: 1.23 MG/DL
CRP SERPL HS-MCNC: 0.78 MG/L
CRP SERPL-MCNC: <3 MG/L
EGFR: 60 ML/MIN/1.73M2
EOSINOPHIL # BLD AUTO: 0.17 K/UL
EOSINOPHIL NFR BLD AUTO: 1.7 %
ESTIMATED AVERAGE GLUCOSE: 126 MG/DL
GGT SERPL-CCNC: 21 U/L
GLUCOSE SERPL-MCNC: 94 MG/DL
HBA1C MFR BLD HPLC: 6 %
HCT VFR BLD CALC: 45.4 %
HDLC SERPL-MCNC: 35 MG/DL
HGB BLD-MCNC: 15.5 G/DL
IMM GRANULOCYTES NFR BLD AUTO: 0.5 %
LDH SERPL-CCNC: 162 U/L
LDLC SERPL CALC-MCNC: 37 MG/DL
LYMPHOCYTES # BLD AUTO: 2.05 K/UL
LYMPHOCYTES NFR BLD AUTO: 20.7 %
MAGNESIUM SERPL-MCNC: 2 MG/DL
MAN DIFF?: NORMAL
MCHC RBC-ENTMCNC: 33.5 PG
MCHC RBC-ENTMCNC: 34.1 GM/DL
MCV RBC AUTO: 98.1 FL
MONOCYTES # BLD AUTO: 0.95 K/UL
MONOCYTES NFR BLD AUTO: 9.6 %
NEUTROPHILS # BLD AUTO: 6.59 K/UL
NEUTROPHILS NFR BLD AUTO: 66.8 %
NONHDLC SERPL-MCNC: 62 MG/DL
PLATELET # BLD AUTO: 149 K/UL
POTASSIUM SERPL-SCNC: 3.9 MMOL/L
PROT SERPL-MCNC: 6.9 G/DL
PSA SERPL-MCNC: 0.54 NG/ML
RBC # BLD: 4.63 M/UL
RBC # FLD: 14.4 %
SODIUM SERPL-SCNC: 139 MMOL/L
T3RU NFR SERPL: 0.9 TBI
T4 SERPL-MCNC: 6 UG/DL
TRIGL SERPL-MCNC: 126 MG/DL
TSH SERPL-ACNC: 2.24 UIU/ML
URATE SERPL-MCNC: 5.5 MG/DL
WBC # FLD AUTO: 9.88 K/UL

## 2022-03-22 ENCOUNTER — RESULT REVIEW (OUTPATIENT)
Age: 77
End: 2022-03-22

## 2022-03-22 ENCOUNTER — OUTPATIENT (OUTPATIENT)
Dept: OUTPATIENT SERVICES | Facility: HOSPITAL | Age: 77
LOS: 1 days | End: 2022-03-22
Payer: MEDICARE

## 2022-03-22 ENCOUNTER — APPOINTMENT (OUTPATIENT)
Dept: CT IMAGING | Facility: IMAGING CENTER | Age: 77
End: 2022-03-22
Payer: MEDICARE

## 2022-03-22 DIAGNOSIS — J84.10 PULMONARY FIBROSIS, UNSPECIFIED: ICD-10-CM

## 2022-03-22 DIAGNOSIS — Z86.018 PERSONAL HISTORY OF OTHER BENIGN NEOPLASM: ICD-10-CM

## 2022-03-22 DIAGNOSIS — I10 ESSENTIAL (PRIMARY) HYPERTENSION: ICD-10-CM

## 2022-03-22 PROCEDURE — G1004: CPT

## 2022-03-22 PROCEDURE — 71250 CT THORAX DX C-: CPT | Mod: 26,MF

## 2022-03-22 PROCEDURE — 71250 CT THORAX DX C-: CPT | Mod: MF

## 2022-08-24 ENCOUNTER — LABORATORY RESULT (OUTPATIENT)
Age: 77
End: 2022-08-24

## 2022-08-24 ENCOUNTER — APPOINTMENT (OUTPATIENT)
Dept: INTERNAL MEDICINE | Facility: CLINIC | Age: 77
End: 2022-08-24

## 2022-08-24 ENCOUNTER — NON-APPOINTMENT (OUTPATIENT)
Age: 77
End: 2022-08-24

## 2022-08-24 VITALS
HEIGHT: 70.5 IN | DIASTOLIC BLOOD PRESSURE: 80 MMHG | SYSTOLIC BLOOD PRESSURE: 122 MMHG | WEIGHT: 180 LBS | BODY MASS INDEX: 25.48 KG/M2

## 2022-08-24 LAB
BILIRUB UR QL STRIP: NORMAL
CLARITY UR: CLEAR
COLLECTION METHOD: NORMAL
GLUCOSE UR-MCNC: NORMAL
HCG UR QL: 0.2 EU/DL
HGB UR QL STRIP.AUTO: NORMAL
KETONES UR-MCNC: NORMAL
LEUKOCYTE ESTERASE UR QL STRIP: NORMAL
NITRITE UR QL STRIP: NORMAL
PH UR STRIP: 5.5
PROT UR STRIP-MCNC: NORMAL
SP GR UR STRIP: 1.02

## 2022-08-24 PROCEDURE — 81003 URINALYSIS AUTO W/O SCOPE: CPT | Mod: QW

## 2022-08-24 PROCEDURE — 99214 OFFICE O/P EST MOD 30 MIN: CPT | Mod: 25

## 2022-08-24 PROCEDURE — 93000 ELECTROCARDIOGRAM COMPLETE: CPT | Mod: 59

## 2022-08-24 PROCEDURE — 36415 COLL VENOUS BLD VENIPUNCTURE: CPT

## 2022-08-24 NOTE — PHYSICAL EXAM
[No JVD] : no jugular venous distention [Normal] : normal rate, regular rhythm, normal S1 and S2 and no murmur heard [No Edema] : there was no peripheral edema [No Focal Deficits] : no focal deficits [de-identified] : Less agitated less aggressive

## 2022-08-24 NOTE — ASSESSMENT
[FreeTextEntry1] : This is a 76-year-old male whose history has been reviewed above\par \par He has a history of hypertension his blood pressure is under good control he has no orthostasis of significance no medication changes\par \par He has a history of ASHD his EKG is unchanged he has been followed by cardiology he has no chest pain no intervention\par \par He continues to have some personality changes he is medicated and seems to be content no intervention\par \par He has a history of depression he remains on an SSRI appropriate blood tests have been drawn medication changes predicated on the results\par \par He has a history of hypercholesterolemia he remains on a statin a cholesterol profile has been obtained medication changes predicated on the results\par \par

## 2022-08-24 NOTE — HISTORY OF PRESENT ILLNESS
[FreeTextEntry1] : This is a 76-year-old male for evaluation and treatment of his hypertension hypercholesterolemia gout depression [de-identified] : Patient states she is in his usual state of health.  His only complaint is insomnia.  He states he refuses to take psychotropic medications for this.  He specifically has no cardiovascular complaints.  It does seem that his mental status continues to deteriorate slowly

## 2022-08-24 NOTE — ASSESSMENT
[FreeTextEntry1] : This is a 77-year-old male whose history has been reviewed above\par \par He has a history of hypertension his blood pressure is under excellent control he has no orthostasis no medication changes\par \par He has a history of hypercholesterolemia remains on a statin a cholesterol profile has been obtained medication changes predicated on the results\par \par He has a history of ASHD he has recently seen cardiology who feels he is stable his last cath was demonstrative of patent coronaries.  He has no chest pain no shortness of breath his EKG is unchanged.  No intervention\par \par He has a history of depression he remains on an SSRI with excellent results appropriate blood tests have been drawn\par \par He has a history of gout he has had no attacks he remains on allopurinol\par \par He declines any medication for his insomnia.  Overall he seems quite stable.  From a personality standpoint he is much less aggressive and seems content\par \par He is up-to-date with COVID vaccinations

## 2022-08-24 NOTE — HISTORY OF PRESENT ILLNESS
[FreeTextEntry1] : This is a 77-year-old male for evaluation and treatment of his hypertension depression hypercholesterolemia ASHD and gout [de-identified] : Patient seems quieter and much less agitated.  His major complaints or insomnia and sleeping during the day.  He has lost weight intentionally\par \par He has no cardiovascular complaints

## 2022-08-25 LAB
25(OH)D3 SERPL-MCNC: 43.8 NG/ML
ALBUMIN SERPL ELPH-MCNC: 4.1 G/DL
ALP BLD-CCNC: 84 U/L
ALT SERPL-CCNC: 17 U/L
ANION GAP SERPL CALC-SCNC: 11 MMOL/L
AST SERPL-CCNC: 21 U/L
BASOPHILS # BLD AUTO: 0.06 K/UL
BASOPHILS NFR BLD AUTO: 0.7 %
BILIRUB SERPL-MCNC: 0.4 MG/DL
BUN SERPL-MCNC: 19 MG/DL
CALCIUM SERPL-MCNC: 9.8 MG/DL
CHLORIDE SERPL-SCNC: 102 MMOL/L
CHOLEST SERPL-MCNC: 93 MG/DL
CK SERPL-CCNC: 69 U/L
CO2 SERPL-SCNC: 25 MMOL/L
CREAT SERPL-MCNC: 1.24 MG/DL
CRP SERPL HS-MCNC: 1.17 MG/L
CRP SERPL-MCNC: <3 MG/L
EGFR: 59 ML/MIN/1.73M2
EOSINOPHIL # BLD AUTO: 0.21 K/UL
EOSINOPHIL NFR BLD AUTO: 2.4 %
ESTIMATED AVERAGE GLUCOSE: 126 MG/DL
GGT SERPL-CCNC: 21 U/L
GLUCOSE SERPL-MCNC: 125 MG/DL
HBA1C MFR BLD HPLC: 6 %
HCT VFR BLD CALC: 43.1 %
HDLC SERPL-MCNC: 32 MG/DL
HGB BLD-MCNC: 15.3 G/DL
IMM GRANULOCYTES NFR BLD AUTO: 0.3 %
LDH SERPL-CCNC: 140 U/L
LDLC SERPL CALC-MCNC: 32 MG/DL
LYMPHOCYTES # BLD AUTO: 2.3 K/UL
LYMPHOCYTES NFR BLD AUTO: 25.9 %
MAGNESIUM SERPL-MCNC: 1.8 MG/DL
MAN DIFF?: NORMAL
MCHC RBC-ENTMCNC: 35 PG
MCHC RBC-ENTMCNC: 35.5 GM/DL
MCV RBC AUTO: 98.6 FL
MONOCYTES # BLD AUTO: 0.73 K/UL
MONOCYTES NFR BLD AUTO: 8.2 %
NEUTROPHILS # BLD AUTO: 5.56 K/UL
NEUTROPHILS NFR BLD AUTO: 62.5 %
NONHDLC SERPL-MCNC: 61 MG/DL
PLATELET # BLD AUTO: 153 K/UL
POTASSIUM SERPL-SCNC: 3.7 MMOL/L
PROT SERPL-MCNC: 6.7 G/DL
PSA FREE FLD-MCNC: 45 %
PSA FREE SERPL-MCNC: 0.26 NG/ML
PSA SERPL-MCNC: 0.58 NG/ML
RBC # BLD: 4.37 M/UL
RBC # FLD: 14.6 %
SODIUM SERPL-SCNC: 138 MMOL/L
T3RU NFR SERPL: 0.8 TBI
T4 SERPL-MCNC: 5.7 UG/DL
TRIGL SERPL-MCNC: 142 MG/DL
TSH SERPL-ACNC: 2.56 UIU/ML
URATE SERPL-MCNC: 5.7 MG/DL
WBC # FLD AUTO: 8.89 K/UL

## 2022-10-12 NOTE — HEALTH RISK ASSESSMENT
[Good] : ~his/her~ current health as good [Fair] :  ~his/her~ mood as fair [Yes] : Yes [No falls in past year] : Patient reported no falls in the past year [Change in mental status noted] : Change in mental status noted [Retired] : retired [With Significant Other] : lives with significant other [None] : None [Sexually Active] : sexually active [] :  [Fully functional (bathing, dressing, toileting, transferring, walking, feeding)] : Fully functional (bathing, dressing, toileting, transferring, walking, feeding) [Feels Safe at Home] : Feels safe at home [Fully functional (using the telephone, shopping, preparing meals, housekeeping, doing laundry, using] : Fully functional and needs no help or supervision to perform IADLs (using the telephone, shopping, preparing meals, housekeeping, doing laundry, using transportation, managing medications and managing finances) [Carbon Monoxide Detector] : carbon monoxide detector [Smoke Detector] : smoke detector [Seat Belt] :  uses seat belt [Sunscreen] : uses sunscreen [I will adhere to the patient's wishes as expressed in the advance directive except as noted below.] : I will adhere to the patient's wishes as expressed in the advance directive except as noted below [] : No [Reports changes in hearing] : Reports no changes in hearing [Reports changes in dental health] : Reports no changes in dental health [Reports changes in vision] : Reports no changes in vision [Safety elements used in home] : no safety elements used in home [Guns at Home] : no guns at home [TB Exposure] : is not being exposed to tuberculosis [Travel to Developing Areas] : does not  travel to developing areas [Caregiver Concerns] : does not have caregiver concerns [FreeTextEntry4] : wife is healthcare proxy no

## 2022-12-28 ENCOUNTER — LABORATORY RESULT (OUTPATIENT)
Age: 77
End: 2022-12-28

## 2022-12-28 ENCOUNTER — NON-APPOINTMENT (OUTPATIENT)
Age: 77
End: 2022-12-28

## 2022-12-28 ENCOUNTER — APPOINTMENT (OUTPATIENT)
Dept: INTERNAL MEDICINE | Facility: CLINIC | Age: 77
End: 2022-12-28
Payer: MEDICARE

## 2022-12-28 VITALS
WEIGHT: 180 LBS | BODY MASS INDEX: 25.48 KG/M2 | DIASTOLIC BLOOD PRESSURE: 84 MMHG | HEIGHT: 70.5 IN | SYSTOLIC BLOOD PRESSURE: 126 MMHG

## 2022-12-28 VITALS — DIASTOLIC BLOOD PRESSURE: 80 MMHG | SYSTOLIC BLOOD PRESSURE: 122 MMHG

## 2022-12-28 DIAGNOSIS — Z00.00 ENCOUNTER FOR GENERAL ADULT MEDICAL EXAMINATION W/OUT ABNORMAL FINDINGS: ICD-10-CM

## 2022-12-28 PROCEDURE — G0439: CPT

## 2022-12-28 PROCEDURE — 93000 ELECTROCARDIOGRAM COMPLETE: CPT | Mod: 59

## 2022-12-28 PROCEDURE — 81003 URINALYSIS AUTO W/O SCOPE: CPT | Mod: QW

## 2022-12-28 PROCEDURE — 99214 OFFICE O/P EST MOD 30 MIN: CPT | Mod: 25

## 2022-12-28 NOTE — PHYSICAL EXAM
[No Acute Distress] : no acute distress [Well Nourished] : well nourished [Well Developed] : well developed [Well-Appearing] : well-appearing [Normal Sclera/Conjunctiva] : normal sclera/conjunctiva [PERRL] : pupils equal round and reactive to light [EOMI] : extraocular movements intact [Normal Outer Ear/Nose] : the outer ears and nose were normal in appearance [Normal Oropharynx] : the oropharynx was normal [No JVD] : no jugular venous distention [No Lymphadenopathy] : no lymphadenopathy [Supple] : supple [Thyroid Normal, No Nodules] : the thyroid was normal and there were no nodules present [No Respiratory Distress] : no respiratory distress  [No Accessory Muscle Use] : no accessory muscle use [Clear to Auscultation] : lungs were clear to auscultation bilaterally [Normal Rate] : normal rate  [Regular Rhythm] : with a regular rhythm [Normal S1, S2] : normal S1 and S2 [No Murmur] : no murmur heard [No Carotid Bruits] : no carotid bruits [No Abdominal Bruit] : a ~M bruit was not heard ~T in the abdomen [No Varicosities] : no varicosities [Pedal Pulses Present] : the pedal pulses are present [No Edema] : there was no peripheral edema [No Palpable Aorta] : no palpable aorta [No Extremity Clubbing/Cyanosis] : no extremity clubbing/cyanosis [Soft] : abdomen soft [Non Tender] : non-tender [Non-distended] : non-distended [No Masses] : no abdominal mass palpated [No HSM] : no HSM [Normal Bowel Sounds] : normal bowel sounds [Normal Posterior Cervical Nodes] : no posterior cervical lymphadenopathy [Normal Anterior Cervical Nodes] : no anterior cervical lymphadenopathy [No CVA Tenderness] : no CVA  tenderness [No Spinal Tenderness] : no spinal tenderness [No Joint Swelling] : no joint swelling [Grossly Normal Strength/Tone] : grossly normal strength/tone [No Rash] : no rash [Coordination Grossly Intact] : coordination grossly intact [No Focal Deficits] : no focal deficits [Normal Gait] : normal gait [Deep Tendon Reflexes (DTR)] : deep tendon reflexes were 2+ and symmetric [Normal Affect] : the affect was normal [Normal Insight/Judgement] : insight and judgment were intact [de-identified] : Difficulty with ambulation which appears to be mechanical secondary to back pain

## 2022-12-28 NOTE — ASSESSMENT
[FreeTextEntry1] : This is a 77-year-old male whose history has been reviewed above\par \par He has a history of hypertension his blood pressure today is at goal he has no orthostasis no medication changes\par \par He has a history of ASHD he had a PTCA without a stent in the early 90s and a subsequent cath in 1999 with patent coronaries.  He has no cardiac symptomatology his cardiac exam remains normal.  His EKG is significant for sinus bradycardia right bundle and left anterior hemiblock which has been stable.  In addition he has been evaluated by cardiology who notes that he is stable\par \par He has a history of mild cognitive change and depression he remains on SSRI with good results appropriate blood tests have been drawn including serum sodium\par \par He has a history of hypercholesterolemia remains on a statin a cholesterol profile has been obtained medication changes predicated on the results\par \par He states his low back pain has improved he exercises minimally on a daily basis for his back.  I have encouraged him to take walks which he states he is enjoys but he does not do.  As stated earlier he states that his back pain is improved but his gait is somewhat compromised and he had difficulty getting off the table.  I will speak to his wife about physical therapy\par \par He is insomnia seems to have improved but not ideal\par \par He did have a CT of the chest and abdomen in 2019 which were negative with exception of a pulmonary granuloma CT of the chest was done in 2022 which showed it to be stable for greater than 5 years and benign\par \par \par

## 2022-12-28 NOTE — HISTORY OF PRESENT ILLNESS
[FreeTextEntry1] : This is a 77-year-old male for annual health assessment.  Specifically we will address his history of hypertension hypercholesterolemia mild obstructive sleep disorder gout ASHD altered cognition and behavior [de-identified] : Patient is feeling well.  He does have difficulty with hearing and states he needs to change his glasses.  He seems calm and appropriate today.  He has nocturia x1.  His back pain has improved.  He he continues to be rather sedentary.  He does state that he feels cold at normal room temperatures

## 2022-12-28 NOTE — HEALTH RISK ASSESSMENT
[Never] : Never [Yes] : Yes [No falls in past year] : Patient reported no falls in the past year [Change in mental status noted] : Change in mental status noted [None] : None [With Significant Other] : lives with significant other [Retired] : retired [Graduate School] : graduate school [] :  [Feels Safe at Home] : Feels safe at home [Fully functional (bathing, dressing, toileting, transferring, walking, feeding)] : Fully functional (bathing, dressing, toileting, transferring, walking, feeding) [Fully functional (using the telephone, shopping, preparing meals, housekeeping, doing laundry, using] : Fully functional and needs no help or supervision to perform IADLs (using the telephone, shopping, preparing meals, housekeeping, doing laundry, using transportation, managing medications and managing finances) [Reports changes in hearing] : Reports changes in hearing [Smoke Detector] : smoke detector [Travel to Developing Areas] : travel to developing areas [I will adhere to the patient's wishes.] : I will adhere to the patient's wishes. [Good] : ~his/her~  mood as  good [Reports changes in vision] : Reports changes in vision [de-identified] : History [Sexually Active] : not sexually active [Reports changes in dental health] : Reports no changes in dental health [Safety elements used in home] : no safety elements used in home [Seat Belt] : does not use seat belt [TB Exposure] : is not being exposed to tuberculosis [Caregiver Concerns] : does not have caregiver concerns

## 2022-12-30 LAB
25(OH)D3 SERPL-MCNC: 43 NG/ML
ALBUMIN SERPL ELPH-MCNC: 4.4 G/DL
ALP BLD-CCNC: 90 U/L
ALT SERPL-CCNC: 18 U/L
ANION GAP SERPL CALC-SCNC: 10 MMOL/L
AST SERPL-CCNC: 21 U/L
BASOPHILS # BLD AUTO: 0.05 K/UL
BASOPHILS NFR BLD AUTO: 0.6 %
BILIRUB SERPL-MCNC: 0.7 MG/DL
BUN SERPL-MCNC: 19 MG/DL
CALCIUM SERPL-MCNC: 10.5 MG/DL
CHLORIDE SERPL-SCNC: 96 MMOL/L
CHOLEST SERPL-MCNC: 107 MG/DL
CK SERPL-CCNC: 91 U/L
CO2 SERPL-SCNC: 30 MMOL/L
CREAT SERPL-MCNC: 1.38 MG/DL
CRP SERPL HS-MCNC: 0.78 MG/L
CRP SERPL-MCNC: <3 MG/L
EGFR: 52 ML/MIN/1.73M2
EOSINOPHIL # BLD AUTO: 0.22 K/UL
EOSINOPHIL NFR BLD AUTO: 2.5 %
ESTIMATED AVERAGE GLUCOSE: 120 MG/DL
GGT SERPL-CCNC: 24 U/L
GLUCOSE SERPL-MCNC: 78 MG/DL
HBA1C MFR BLD HPLC: 5.8 %
HCT VFR BLD CALC: 49.2 %
HDLC SERPL-MCNC: 42 MG/DL
HGB BLD-MCNC: 16.8 G/DL
IMM GRANULOCYTES NFR BLD AUTO: 0.3 %
LDH SERPL-CCNC: 156 U/L
LDLC SERPL CALC-MCNC: 42 MG/DL
LYMPHOCYTES # BLD AUTO: 1.99 K/UL
LYMPHOCYTES NFR BLD AUTO: 23.1 %
MAGNESIUM SERPL-MCNC: 2 MG/DL
MAN DIFF?: NORMAL
MCHC RBC-ENTMCNC: 34.1 GM/DL
MCHC RBC-ENTMCNC: 34.2 PG
MCV RBC AUTO: 100.2 FL
MONOCYTES # BLD AUTO: 0.81 K/UL
MONOCYTES NFR BLD AUTO: 9.4 %
NEUTROPHILS # BLD AUTO: 5.53 K/UL
NEUTROPHILS NFR BLD AUTO: 64.1 %
NONHDLC SERPL-MCNC: 65 MG/DL
PLATELET # BLD AUTO: 156 K/UL
POTASSIUM SERPL-SCNC: 4 MMOL/L
PROT SERPL-MCNC: 7.4 G/DL
PSA SERPL-MCNC: 0.62 NG/ML
RBC # BLD: 4.91 M/UL
RBC # FLD: 14.4 %
SODIUM SERPL-SCNC: 136 MMOL/L
T3RU NFR SERPL: 0.8 TBI
T4 SERPL-MCNC: 6.3 UG/DL
TRIGL SERPL-MCNC: 113 MG/DL
TSH SERPL-ACNC: 2.33 UIU/ML
URATE SERPL-MCNC: 5.5 MG/DL
WBC # FLD AUTO: 8.63 K/UL

## 2023-01-03 ENCOUNTER — TRANSCRIPTION ENCOUNTER (OUTPATIENT)
Age: 78
End: 2023-01-03

## 2023-01-18 ENCOUNTER — APPOINTMENT (OUTPATIENT)
Dept: CARDIOLOGY | Facility: CLINIC | Age: 78
End: 2023-01-18
Payer: MEDICARE

## 2023-01-18 VITALS
BODY MASS INDEX: 25.46 KG/M2 | HEART RATE: 55 BPM | DIASTOLIC BLOOD PRESSURE: 70 MMHG | OXYGEN SATURATION: 97 % | SYSTOLIC BLOOD PRESSURE: 130 MMHG | WEIGHT: 180 LBS

## 2023-01-18 DIAGNOSIS — M10.9 GOUT, UNSPECIFIED: ICD-10-CM

## 2023-01-18 PROCEDURE — 99214 OFFICE O/P EST MOD 30 MIN: CPT

## 2023-01-18 RX ORDER — ROSUVASTATIN CALCIUM 10 MG/1
10 TABLET, FILM COATED ORAL
Refills: 0 | Status: ACTIVE | COMMUNITY

## 2023-01-18 NOTE — DISCUSSION/SUMMARY
[FreeTextEntry1] :  patient will continue on his present regimen of medications.  At this point no further cardiac work-up is needed.  Routine follow with me on a yearly basis unless is a change in his symptomatology.

## 2023-01-18 NOTE — HISTORY OF PRESENT ILLNESS
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  He is status post remote catheterization and PTCA without stent in the early 90s.  Last catheterization in 1999 coronaries were patent\par \par He has been quite stable since then with no significant cardiac issues. \par I saw him over a year ago and he had a stress echocardiogram which was unremarkable full report will follow from my previous [facility] office\par \par He is followed carefully by Dr. Griffith\par His last EKG revealed normal sinus rhythm with a right bundle branch block but the EKG today is normal former EKGs revealed normal sinus rhythm with a left axis deviation otherwise within normal limits\par \par He has reasonable exercise tolerance no chest pain no palpitations no dizziness or syncope no edema no orthopnea or PND\par \par He remains on stable medication\par Hemoglobin A1c 5.8 PSA 0.61\par Lipid panel HDL 30 cholesterol 93 triglycerides 102 LDL 42 non-HDL 62\par \par He had an echo and carotid Doppler in 2015 which were unremarkable with a hyperdynamic left ventricle\par \par  EKG dated March 2022 normal sinus rhythm right bundle branch block left anterior hemiblock\par \par  test December 2022\par  hemoglobin 16.8 49.2 hemoglobin A1c 5.8\par  LDL 42 cholesterol 107 HDl 4242\par  creatinine 1.38 potassium 4.0 GFR 52\par \par  visit January 18, 2023: The patient feels well and offers no complaints of chest pain chest pressure shortness of breath lightheadedness dizziness or syncope.  He remains on stable medications including allopurinol 100 atenolol 50 candesartan 32 mg Dyazide Lexapro 10 mg rosuvastatin 10

## 2023-01-18 NOTE — PHYSICAL EXAM
[Well Developed] : well developed [Well Nourished] : well nourished [Normal Conjunctiva] : normal conjunctiva [Normal Venous Pressure] : normal venous pressure [No Carotid Bruit] : no carotid bruit [Normal S1, S2] : normal S1, S2 [No Murmur] : no murmur [Clear Lung Fields] : clear lung fields [No Respiratory Distress] : no respiratory distress  [Soft] : abdomen soft [Non Tender] : non-tender [Normal Gait] : normal gait [No Edema] : no edema

## 2023-01-18 NOTE — ASSESSMENT
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  His blood pressure is well controlled and his EKG is stable.  He does have some degree of a cognitive disorder and decreased hearing as well.  The wife notes this and is of great concern to her.  He probably has some form of an early dementia but overall he is functioning normally and is free of any cardiac symptoms. according to his wife he has become difficult in terms of his behavior perhaps related to his underlying personality plus some element of progressive dementia.  There are no active cardiac issues\par \par 1.  Hypertension well-controlled\par 2.  Chronic ischemic heart disease no active angina previous normal noninvasive work-up\par 3.  Cognitive disorder perhaps progressive\par 4.  Presently no active cardiac issues\par  5.  Lipid profile is excellent\par \par  there are no active cardiac issues

## 2023-04-18 ENCOUNTER — APPOINTMENT (OUTPATIENT)
Dept: INTERNAL MEDICINE | Facility: CLINIC | Age: 78
End: 2023-04-18

## 2023-04-24 RX ORDER — DIAZEPAM 5 MG/1
5 TABLET ORAL TWICE DAILY
Qty: 60 | Refills: 0 | Status: ACTIVE | COMMUNITY
Start: 2018-08-06 | End: 1900-01-01

## 2023-05-05 NOTE — HISTORY OF PRESENT ILLNESS
[FreeTextEntry1] :  this is a 74-year-old male for evaluation and treatment of his hypertension hypercholesterolemia ASHD gout depression and hicoups [de-identified] : Patient has no cardiovascular complaints. He seems a bit more relaxed today. He has had an episode of hiccups which responded to baclofen needs device and assist

## 2023-05-23 ENCOUNTER — APPOINTMENT (OUTPATIENT)
Dept: INTERNAL MEDICINE | Facility: CLINIC | Age: 78
End: 2023-05-23
Payer: MEDICARE

## 2023-05-23 ENCOUNTER — LABORATORY RESULT (OUTPATIENT)
Age: 78
End: 2023-05-23

## 2023-05-23 ENCOUNTER — NON-APPOINTMENT (OUTPATIENT)
Age: 78
End: 2023-05-23

## 2023-05-23 VITALS — SYSTOLIC BLOOD PRESSURE: 136 MMHG | DIASTOLIC BLOOD PRESSURE: 70 MMHG

## 2023-05-23 VITALS — DIASTOLIC BLOOD PRESSURE: 70 MMHG | SYSTOLIC BLOOD PRESSURE: 120 MMHG

## 2023-05-23 PROCEDURE — 93000 ELECTROCARDIOGRAM COMPLETE: CPT | Mod: 59

## 2023-05-23 PROCEDURE — 99214 OFFICE O/P EST MOD 30 MIN: CPT | Mod: 25

## 2023-05-23 PROCEDURE — 36415 COLL VENOUS BLD VENIPUNCTURE: CPT

## 2023-05-23 RX ORDER — BACLOFEN 10 MG/1
10 TABLET ORAL
Qty: 90 | Refills: 3 | Status: ACTIVE | COMMUNITY
Start: 2023-05-23 | End: 1900-01-01

## 2023-05-23 NOTE — PHYSICAL EXAM
[Normal] : normal rate, regular rhythm, normal S1 and S2 and no murmur heard [de-identified] : Mild difficulty with heel-to-toe

## 2023-05-23 NOTE — HISTORY OF PRESENT ILLNESS
[FreeTextEntry8] : This is an 78-year-old male brought in by his wife because of change in balance.  He did have an unwitnessed fall as well.  He has been suffering from insomnia and ataxia.\par \par Patient's personality remains similar\par \par Patient was able to sleep last night with the use of baclofen and apparently that has restored much of his baseline mental status\par \par Patient remembers the incident states he does not recall losing consciousness

## 2023-05-23 NOTE — ASSESSMENT
[FreeTextEntry1] : This is a 78-year-old male whose history has been reviewed above\par \par He apparently fell in the shower this was the combination of difficulties with balance.  He has no evidence of trauma.  His neurologic examination with the exception of possible increase in his loss of balance is unchanged.  His mental status is unchanged.\par \par His wife does state that he did have a period of confusion after the fall\par \par He does have a high degree of cardiac block but this is unchanged and he does not appear to have had any true syncope\par \par We will obtain a CT of the brain to rule out CVA or subdural.  In addition we will get a B12 level and routine chemistries to rule out electrolyte abnormalities.  I do not think he would tolerate an MRI\par \par As stated above EKG is unchanged\par \par He was able to sleep with baclofen so we will continue to prescribe

## 2023-05-24 ENCOUNTER — APPOINTMENT (OUTPATIENT)
Dept: CT IMAGING | Facility: IMAGING CENTER | Age: 78
End: 2023-05-24

## 2023-05-24 LAB
25(OH)D3 SERPL-MCNC: 39.1 NG/ML
ALBUMIN SERPL ELPH-MCNC: 4.5 G/DL
ALP BLD-CCNC: 86 U/L
ALT SERPL-CCNC: 21 U/L
ANION GAP SERPL CALC-SCNC: 15 MMOL/L
AST SERPL-CCNC: 23 U/L
BILIRUB SERPL-MCNC: 0.7 MG/DL
BUN SERPL-MCNC: 27 MG/DL
CALCIUM SERPL-MCNC: 10.8 MG/DL
CHLORIDE SERPL-SCNC: 97 MMOL/L
CHOLEST SERPL-MCNC: 121 MG/DL
CK SERPL-CCNC: 67 U/L
CO2 SERPL-SCNC: 25 MMOL/L
CREAT SERPL-MCNC: 1.45 MG/DL
CRP SERPL HS-MCNC: 1.43 MG/L
CRP SERPL-MCNC: <3 MG/L
EGFR: 49 ML/MIN/1.73M2
ESTIMATED AVERAGE GLUCOSE: 126 MG/DL
GGT SERPL-CCNC: 22 U/L
GLUCOSE SERPL-MCNC: 99 MG/DL
HBA1C MFR BLD HPLC: 6 %
HDLC SERPL-MCNC: 40 MG/DL
LDH SERPL-CCNC: 158 U/L
LDLC SERPL CALC-MCNC: 45 MG/DL
MAGNESIUM SERPL-MCNC: 2 MG/DL
NONHDLC SERPL-MCNC: 80 MG/DL
POTASSIUM SERPL-SCNC: 4 MMOL/L
PROT SERPL-MCNC: 7.3 G/DL
PSA FREE FLD-MCNC: 45 %
PSA FREE SERPL-MCNC: 0.26 NG/ML
PSA SERPL-MCNC: 0.59 NG/ML
SODIUM SERPL-SCNC: 137 MMOL/L
T3RU NFR SERPL: 0.7 TBI
T4 SERPL-MCNC: 5.5 UG/DL
TRIGL SERPL-MCNC: 173 MG/DL
TSH SERPL-ACNC: 2.16 UIU/ML
URATE SERPL-MCNC: 6.1 MG/DL
VIT B12 SERPL-MCNC: 409 PG/ML

## 2023-05-26 ENCOUNTER — APPOINTMENT (OUTPATIENT)
Dept: MRI IMAGING | Facility: CLINIC | Age: 78
End: 2023-05-26
Payer: MEDICARE

## 2023-05-26 ENCOUNTER — OUTPATIENT (OUTPATIENT)
Dept: OUTPATIENT SERVICES | Facility: HOSPITAL | Age: 78
LOS: 1 days | End: 2023-05-26
Payer: MEDICARE
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
## 2023-05-26 LAB
CALCIUM SERPL-MCNC: 10.7 MG/DL
PARATHYROID HORMONE INTACT: 22 PG/ML

## 2023-05-26 PROCEDURE — 70551 MRI BRAIN STEM W/O DYE: CPT | Mod: 26,MH

## 2023-05-26 PROCEDURE — 70551 MRI BRAIN STEM W/O DYE: CPT

## 2023-06-17 LAB
25(OH)D3 SERPL-MCNC: 38.8 NG/ML
ALBUMIN SERPL ELPH-MCNC: 4.4 G/DL
ALP BLD-CCNC: 89 U/L
ALT SERPL-CCNC: 19 U/L
ANION GAP SERPL CALC-SCNC: 13 MMOL/L
AST SERPL-CCNC: 22 U/L
BILIRUB SERPL-MCNC: 0.4 MG/DL
BUN SERPL-MCNC: 20 MG/DL
CALCIUM SERPL-MCNC: 10 MG/DL
CALCIUM SERPL-MCNC: 10 MG/DL
CALCIUM SERPL-MCNC: 10.1 MG/DL
CHLORIDE SERPL-SCNC: 99 MMOL/L
CO2 SERPL-SCNC: 26 MMOL/L
CREAT SERPL-MCNC: 1.27 MG/DL
EGFR: 57 ML/MIN/1.73M2
GLUCOSE SERPL-MCNC: 88 MG/DL
PARATHYROID HORMONE INTACT: 28 PG/ML
POTASSIUM SERPL-SCNC: 4 MMOL/L
PROT SERPL-MCNC: 7 G/DL
SODIUM SERPL-SCNC: 137 MMOL/L

## 2023-06-19 ENCOUNTER — NON-APPOINTMENT (OUTPATIENT)
Age: 78
End: 2023-06-19

## 2023-07-19 ENCOUNTER — APPOINTMENT (OUTPATIENT)
Dept: INTERNAL MEDICINE | Facility: CLINIC | Age: 78
End: 2023-07-19

## 2023-08-08 ENCOUNTER — LABORATORY RESULT (OUTPATIENT)
Age: 78
End: 2023-08-08

## 2023-08-08 ENCOUNTER — APPOINTMENT (OUTPATIENT)
Dept: INTERNAL MEDICINE | Facility: CLINIC | Age: 78
End: 2023-08-08
Payer: MEDICARE

## 2023-08-08 VITALS — SYSTOLIC BLOOD PRESSURE: 120 MMHG | DIASTOLIC BLOOD PRESSURE: 72 MMHG

## 2023-08-08 VITALS — HEIGHT: 70.5 IN | BODY MASS INDEX: 25.48 KG/M2 | WEIGHT: 180 LBS

## 2023-08-08 PROCEDURE — 82270 OCCULT BLOOD FECES: CPT

## 2023-08-08 PROCEDURE — 93000 ELECTROCARDIOGRAM COMPLETE: CPT | Mod: 59

## 2023-08-08 PROCEDURE — 99203 OFFICE O/P NEW LOW 30 MIN: CPT | Mod: 25

## 2023-08-08 PROCEDURE — 81003 URINALYSIS AUTO W/O SCOPE: CPT | Mod: QW

## 2023-08-08 PROCEDURE — 36415 COLL VENOUS BLD VENIPUNCTURE: CPT

## 2023-08-08 PROCEDURE — 99213 OFFICE O/P EST LOW 20 MIN: CPT | Mod: 25

## 2023-08-08 NOTE — REVIEW OF SYSTEMS
[Fatigue] : fatigue [Insomnia] : insomnia [Negative] : Respiratory [FreeTextEntry4] : Excess salivation [de-identified] : Some rigidity

## 2023-08-08 NOTE — HISTORY OF PRESENT ILLNESS
[FreeTextEntry1] : This is a 77-year-old Male for evaluation and treatment of his hypertension hypercholesterolemia Gout ASHD and Mood disorder [de-identified] : Patient comes in accompanied by his wife.  He is more agitatedBut also more somnolent.  He has stopped his Lexapro 3 to 4 weeks ago.  He has no cardiovascular complaints but complains bitterly of insomnia.  His wife states this is occurred since she stopped his Lexapro

## 2023-08-08 NOTE — ASSESSMENT
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  He has a history of hypercholesterolemia and remains on Crestor cholesterol profile has been obtained we will aim for an LDL of 70 or below  He has bibasilar Rales I did do a BNP although he has had no cardiac symptoms.  However it is very difficult to truly get a history other than from his wife  He has a history of hypertension he remains on Atacand 32 mg his blood pressure is normal he has no orthostasis appropriate blood tests including BUN and creatinine have been obtained  I do believe he has some parkinsonian features he certainly will not let me add medication I did urged him to go to see a neurologist but he declined.  I do believe he will restart Lexapro which will obviate some of these issues

## 2023-08-08 NOTE — PHYSICAL EXAM
[No JVD] : no jugular venous distention [Normal] : normal rate, regular rhythm, normal S1 and S2 and no murmur heard [No Edema] : there was no peripheral edema [de-identified] : Somnolent [de-identified] : Basilar crackles [de-identified] : Some rigidity.  Excess salivation

## 2023-08-09 LAB
ALBUMIN SERPL ELPH-MCNC: 4.2 G/DL
ALP BLD-CCNC: 78 U/L
ALT SERPL-CCNC: 13 U/L
ANION GAP SERPL CALC-SCNC: 14 MMOL/L
AST SERPL-CCNC: 18 U/L
BILIRUB SERPL-MCNC: 0.4 MG/DL
BUN SERPL-MCNC: 23 MG/DL
CALCIUM SERPL-MCNC: 10 MG/DL
CHLORIDE SERPL-SCNC: 99 MMOL/L
CHOLEST SERPL-MCNC: 97 MG/DL
CK SERPL-CCNC: 97 U/L
CO2 SERPL-SCNC: 23 MMOL/L
CREAT SERPL-MCNC: 1.24 MG/DL
CRP SERPL HS-MCNC: 1.29 MG/L
CRP SERPL-MCNC: <3 MG/L
EGFR: 59 ML/MIN/1.73M2
ESTIMATED AVERAGE GLUCOSE: 126 MG/DL
GGT SERPL-CCNC: 18 U/L
GLUCOSE SERPL-MCNC: 87 MG/DL
HBA1C MFR BLD HPLC: 6 %
HDLC SERPL-MCNC: 35 MG/DL
LDLC SERPL CALC-MCNC: 43 MG/DL
MAGNESIUM SERPL-MCNC: 1.9 MG/DL
NONHDLC SERPL-MCNC: 62 MG/DL
NT-PROBNP SERPL-MCNC: 224 PG/ML
POTASSIUM SERPL-SCNC: 3.9 MMOL/L
PROT SERPL-MCNC: 6.6 G/DL
SODIUM SERPL-SCNC: 136 MMOL/L
T3RU NFR SERPL: 0.7 TBI
T4 SERPL-MCNC: 5.7 UG/DL
TRIGL SERPL-MCNC: 96 MG/DL
TSH SERPL-ACNC: 2.34 UIU/ML
URATE SERPL-MCNC: 6.1 MG/DL

## 2023-08-23 ENCOUNTER — NON-APPOINTMENT (OUTPATIENT)
Age: 78
End: 2023-08-23

## 2023-08-28 ENCOUNTER — NON-APPOINTMENT (OUTPATIENT)
Age: 78
End: 2023-08-28

## 2023-08-29 RX ORDER — ZOLPIDEM TARTRATE 5 MG/1
5 TABLET ORAL
Qty: 30 | Refills: 3 | Status: ACTIVE | COMMUNITY
Start: 2021-06-15 | End: 1900-01-01

## 2023-11-22 ENCOUNTER — APPOINTMENT (OUTPATIENT)
Dept: CARDIOLOGY | Facility: CLINIC | Age: 78
End: 2023-11-22
Payer: MEDICARE

## 2023-11-22 VITALS
WEIGHT: 180 LBS | SYSTOLIC BLOOD PRESSURE: 118 MMHG | DIASTOLIC BLOOD PRESSURE: 80 MMHG | OXYGEN SATURATION: 95 % | HEIGHT: 70.5 IN | HEART RATE: 66 BPM | BODY MASS INDEX: 25.48 KG/M2

## 2023-11-22 DIAGNOSIS — G47.33 OBSTRUCTIVE SLEEP APNEA (ADULT) (PEDIATRIC): ICD-10-CM

## 2023-11-22 DIAGNOSIS — R26.89 OTHER ABNORMALITIES OF GAIT AND MOBILITY: ICD-10-CM

## 2023-11-22 DIAGNOSIS — F32.A DEPRESSION, UNSPECIFIED: ICD-10-CM

## 2023-11-22 PROCEDURE — 99214 OFFICE O/P EST MOD 30 MIN: CPT

## 2023-11-29 ENCOUNTER — LABORATORY RESULT (OUTPATIENT)
Age: 78
End: 2023-11-29

## 2023-11-29 ENCOUNTER — NON-APPOINTMENT (OUTPATIENT)
Age: 78
End: 2023-11-29

## 2023-11-29 ENCOUNTER — APPOINTMENT (OUTPATIENT)
Dept: INTERNAL MEDICINE | Facility: CLINIC | Age: 78
End: 2023-11-29
Payer: MEDICARE

## 2023-11-29 VITALS
HEIGHT: 70.5 IN | WEIGHT: 181 LBS | SYSTOLIC BLOOD PRESSURE: 120 MMHG | DIASTOLIC BLOOD PRESSURE: 78 MMHG | BODY MASS INDEX: 25.62 KG/M2

## 2023-11-29 VITALS — DIASTOLIC BLOOD PRESSURE: 82 MMHG | SYSTOLIC BLOOD PRESSURE: 138 MMHG

## 2023-11-29 DIAGNOSIS — G31.84 MILD COGNITIVE IMPAIRMENT, SO STATED: ICD-10-CM

## 2023-11-29 PROCEDURE — 99214 OFFICE O/P EST MOD 30 MIN: CPT | Mod: 25

## 2023-11-29 PROCEDURE — 93000 ELECTROCARDIOGRAM COMPLETE: CPT

## 2023-11-29 PROCEDURE — 36415 COLL VENOUS BLD VENIPUNCTURE: CPT

## 2023-11-30 LAB
25(OH)D3 SERPL-MCNC: 32.4 NG/ML
ALBUMIN SERPL ELPH-MCNC: 4.4 G/DL
ALP BLD-CCNC: 73 U/L
ALT SERPL-CCNC: 15 U/L
ANION GAP SERPL CALC-SCNC: 11 MMOL/L
AST SERPL-CCNC: 22 U/L
BASOPHILS # BLD AUTO: 0.07 K/UL
BASOPHILS NFR BLD AUTO: 0.8 %
BILIRUB SERPL-MCNC: 0.4 MG/DL
BUN SERPL-MCNC: 17 MG/DL
CALCIUM SERPL-MCNC: 10.1 MG/DL
CHLORIDE SERPL-SCNC: 99 MMOL/L
CHOLEST SERPL-MCNC: 114 MG/DL
CK SERPL-CCNC: 87 U/L
CO2 SERPL-SCNC: 28 MMOL/L
CREAT SERPL-MCNC: 1.17 MG/DL
CRP SERPL HS-MCNC: 0.64 MG/L
CRP SERPL-MCNC: <3 MG/L
EGFR: 63 ML/MIN/1.73M2
EOSINOPHIL # BLD AUTO: 0.27 K/UL
EOSINOPHIL NFR BLD AUTO: 3.2 %
ESTIMATED AVERAGE GLUCOSE: 123 MG/DL
GGT SERPL-CCNC: 19 U/L
GLUCOSE SERPL-MCNC: 89 MG/DL
GLUCOSE SERPL-MCNC: 94 MG/DL
HBA1C MFR BLD HPLC: 5.9 %
HCT VFR BLD CALC: 45.3 %
HDLC SERPL-MCNC: 42 MG/DL
HGB BLD-MCNC: 15.8 G/DL
IMM GRANULOCYTES NFR BLD AUTO: 0.4 %
LDH SERPL-CCNC: 147 U/L
LDLC SERPL CALC-MCNC: 50 MG/DL
LYMPHOCYTES # BLD AUTO: 2.3 K/UL
LYMPHOCYTES NFR BLD AUTO: 26.8 %
MAGNESIUM SERPL-MCNC: 2.1 MG/DL
MAN DIFF?: NORMAL
MCHC RBC-ENTMCNC: 34.9 GM/DL
MCHC RBC-ENTMCNC: 35 PG
MCV RBC AUTO: 100.2 FL
MONOCYTES # BLD AUTO: 0.8 K/UL
MONOCYTES NFR BLD AUTO: 9.3 %
NEUTROPHILS # BLD AUTO: 5.1 K/UL
NEUTROPHILS NFR BLD AUTO: 59.5 %
NONHDLC SERPL-MCNC: 72 MG/DL
PLATELET # BLD AUTO: 151 K/UL
POTASSIUM SERPL-SCNC: 4.2 MMOL/L
PROT SERPL-MCNC: 7.1 G/DL
PSA FREE FLD-MCNC: 53 %
PSA FREE SERPL-MCNC: 0.27 NG/ML
PSA SERPL-MCNC: 0.51 NG/ML
RBC # BLD: 4.52 M/UL
RBC # FLD: 14.9 %
SODIUM SERPL-SCNC: 138 MMOL/L
T3RU NFR SERPL: 0.9 TBI
T4 SERPL-MCNC: 5.8 UG/DL
TRIGL SERPL-MCNC: 126 MG/DL
TSH SERPL-ACNC: 3.65 UIU/ML
URATE SERPL-MCNC: 5.3 MG/DL
WBC # FLD AUTO: 8.57 K/UL

## 2023-12-23 ENCOUNTER — RX RENEWAL (OUTPATIENT)
Age: 78
End: 2023-12-23

## 2023-12-23 RX ORDER — ESCITALOPRAM OXALATE 10 MG/1
10 TABLET ORAL
Qty: 90 | Refills: 3 | Status: ACTIVE | COMMUNITY
Start: 2017-06-21 | End: 1900-01-01

## 2024-02-27 ENCOUNTER — INPATIENT (INPATIENT)
Facility: HOSPITAL | Age: 79
LOS: 0 days | Discharge: HOME HEALTH | End: 2024-02-28
Attending: HOSPITALIST | Admitting: HOSPITALIST
Payer: MEDICARE

## 2024-02-27 ENCOUNTER — APPOINTMENT (OUTPATIENT)
Dept: INTERNAL MEDICINE | Facility: CLINIC | Age: 79
End: 2024-02-27
Payer: MEDICARE

## 2024-02-27 ENCOUNTER — NON-APPOINTMENT (OUTPATIENT)
Age: 79
End: 2024-02-27

## 2024-02-27 VITALS
TEMPERATURE: 97 F | HEART RATE: 67 BPM | DIASTOLIC BLOOD PRESSURE: 51 MMHG | SYSTOLIC BLOOD PRESSURE: 122 MMHG | OXYGEN SATURATION: 94 % | RESPIRATION RATE: 22 BRPM

## 2024-02-27 VITALS — BODY MASS INDEX: 26.05 KG/M2 | WEIGHT: 184 LBS | HEIGHT: 70.5 IN

## 2024-02-27 DIAGNOSIS — R55 SYNCOPE AND COLLAPSE: ICD-10-CM

## 2024-02-27 DIAGNOSIS — R79.89 OTHER SPECIFIED ABNORMAL FINDINGS OF BLOOD CHEMISTRY: ICD-10-CM

## 2024-02-27 DIAGNOSIS — E78.5 HYPERLIPIDEMIA, UNSPECIFIED: ICD-10-CM

## 2024-02-27 DIAGNOSIS — I25.10 ATHEROSCLEROTIC HEART DISEASE OF NATIVE CORONARY ARTERY WITHOUT ANGINA PECTORIS: ICD-10-CM

## 2024-02-27 DIAGNOSIS — I10 ESSENTIAL (PRIMARY) HYPERTENSION: ICD-10-CM

## 2024-02-27 DIAGNOSIS — Z29.9 ENCOUNTER FOR PROPHYLACTIC MEASURES, UNSPECIFIED: ICD-10-CM

## 2024-02-27 DIAGNOSIS — R93.89 ABNORMAL FINDINGS ON DIAGNOSTIC IMAGING OF OTHER SPECIFIED BODY STRUCTURES: ICD-10-CM

## 2024-02-27 DIAGNOSIS — R26.9 UNSPECIFIED ABNORMALITIES OF GAIT AND MOBILITY: ICD-10-CM

## 2024-02-27 DIAGNOSIS — Z98.890 OTHER SPECIFIED POSTPROCEDURAL STATES: Chronic | ICD-10-CM

## 2024-02-27 DIAGNOSIS — D36.9 BENIGN NEOPLASM, UNSPECIFIED SITE: ICD-10-CM

## 2024-02-27 LAB
ADD ON TEST-SPECIMEN IN LAB: SIGNIFICANT CHANGE UP
ALBUMIN SERPL ELPH-MCNC: 4.2 G/DL — SIGNIFICANT CHANGE UP (ref 3.3–5)
ALP SERPL-CCNC: 74 U/L — SIGNIFICANT CHANGE UP (ref 40–120)
ALT FLD-CCNC: 19 U/L — SIGNIFICANT CHANGE UP (ref 4–41)
ANION GAP SERPL CALC-SCNC: 12 MMOL/L — SIGNIFICANT CHANGE UP (ref 7–14)
APPEARANCE UR: CLEAR — SIGNIFICANT CHANGE UP
APTT BLD: 29.5 SEC — SIGNIFICANT CHANGE UP (ref 24.5–35.6)
AST SERPL-CCNC: 23 U/L — SIGNIFICANT CHANGE UP (ref 4–40)
BACTERIA # UR AUTO: NEGATIVE /HPF — SIGNIFICANT CHANGE UP
BASOPHILS # BLD AUTO: 0.06 K/UL — SIGNIFICANT CHANGE UP (ref 0–0.2)
BASOPHILS NFR BLD AUTO: 0.8 % — SIGNIFICANT CHANGE UP (ref 0–2)
BILIRUB SERPL-MCNC: 0.4 MG/DL — SIGNIFICANT CHANGE UP (ref 0.2–1.2)
BILIRUB UR-MCNC: NEGATIVE — SIGNIFICANT CHANGE UP
BUN SERPL-MCNC: 23 MG/DL — SIGNIFICANT CHANGE UP (ref 7–23)
CALCIUM SERPL-MCNC: 9.8 MG/DL — SIGNIFICANT CHANGE UP (ref 8.4–10.5)
CAST: 2 /LPF — SIGNIFICANT CHANGE UP (ref 0–4)
CHLORIDE SERPL-SCNC: 99 MMOL/L — SIGNIFICANT CHANGE UP (ref 98–107)
CK SERPL-CCNC: 110 U/L — SIGNIFICANT CHANGE UP (ref 30–200)
CO2 SERPL-SCNC: 25 MMOL/L — SIGNIFICANT CHANGE UP (ref 22–31)
COLOR SPEC: SIGNIFICANT CHANGE UP
CREAT SERPL-MCNC: 1.49 MG/DL — HIGH (ref 0.5–1.3)
DIFF PNL FLD: NEGATIVE — SIGNIFICANT CHANGE UP
EGFR: 47 ML/MIN/1.73M2 — LOW
EOSINOPHIL # BLD AUTO: 0.1 K/UL — SIGNIFICANT CHANGE UP (ref 0–0.5)
EOSINOPHIL NFR BLD AUTO: 1.3 % — SIGNIFICANT CHANGE UP (ref 0–6)
FLUAV AG NPH QL: SIGNIFICANT CHANGE UP
FLUBV AG NPH QL: SIGNIFICANT CHANGE UP
GLUCOSE SERPL-MCNC: 111 MG/DL — HIGH (ref 70–99)
GLUCOSE UR QL: NEGATIVE MG/DL — SIGNIFICANT CHANGE UP
HCT VFR BLD CALC: 46.3 % — SIGNIFICANT CHANGE UP (ref 39–50)
HGB BLD-MCNC: 15.7 G/DL — SIGNIFICANT CHANGE UP (ref 13–17)
IANC: 5.29 K/UL — SIGNIFICANT CHANGE UP (ref 1.8–7.4)
IMM GRANULOCYTES NFR BLD AUTO: 0.5 % — SIGNIFICANT CHANGE UP (ref 0–0.9)
INR BLD: 1.03 RATIO — SIGNIFICANT CHANGE UP (ref 0.85–1.18)
KETONES UR-MCNC: ABNORMAL MG/DL
LEUKOCYTE ESTERASE UR-ACNC: NEGATIVE — SIGNIFICANT CHANGE UP
LYMPHOCYTES # BLD AUTO: 1.11 K/UL — SIGNIFICANT CHANGE UP (ref 1–3.3)
LYMPHOCYTES # BLD AUTO: 14.8 % — SIGNIFICANT CHANGE UP (ref 13–44)
MAGNESIUM SERPL-MCNC: 2.1 MG/DL — SIGNIFICANT CHANGE UP (ref 1.6–2.6)
MCHC RBC-ENTMCNC: 33.8 PG — SIGNIFICANT CHANGE UP (ref 27–34)
MCHC RBC-ENTMCNC: 33.9 GM/DL — SIGNIFICANT CHANGE UP (ref 32–36)
MCV RBC AUTO: 99.6 FL — SIGNIFICANT CHANGE UP (ref 80–100)
MONOCYTES # BLD AUTO: 0.88 K/UL — SIGNIFICANT CHANGE UP (ref 0–0.9)
MONOCYTES NFR BLD AUTO: 11.8 % — SIGNIFICANT CHANGE UP (ref 2–14)
NEUTROPHILS # BLD AUTO: 5.29 K/UL — SIGNIFICANT CHANGE UP (ref 1.8–7.4)
NEUTROPHILS NFR BLD AUTO: 70.8 % — SIGNIFICANT CHANGE UP (ref 43–77)
NITRITE UR-MCNC: NEGATIVE — SIGNIFICANT CHANGE UP
NRBC # BLD: 0 /100 WBCS — SIGNIFICANT CHANGE UP (ref 0–0)
NRBC # FLD: 0 K/UL — SIGNIFICANT CHANGE UP (ref 0–0)
NT-PROBNP SERPL-SCNC: 335 PG/ML — HIGH
PH UR: 7 — SIGNIFICANT CHANGE UP (ref 5–8)
PHOSPHATE SERPL-MCNC: 3.1 MG/DL — SIGNIFICANT CHANGE UP (ref 2.5–4.5)
PLATELET # BLD AUTO: 124 K/UL — LOW (ref 150–400)
POTASSIUM SERPL-MCNC: 4 MMOL/L — SIGNIFICANT CHANGE UP (ref 3.5–5.3)
POTASSIUM SERPL-SCNC: 4 MMOL/L — SIGNIFICANT CHANGE UP (ref 3.5–5.3)
PROT SERPL-MCNC: 7.5 G/DL — SIGNIFICANT CHANGE UP (ref 6–8.3)
PROT UR-MCNC: NEGATIVE MG/DL — SIGNIFICANT CHANGE UP
PROTHROM AB SERPL-ACNC: 11.6 SEC — SIGNIFICANT CHANGE UP (ref 9.5–13)
RBC # BLD: 4.65 M/UL — SIGNIFICANT CHANGE UP (ref 4.2–5.8)
RBC # FLD: 14.2 % — SIGNIFICANT CHANGE UP (ref 10.3–14.5)
RBC CASTS # UR COMP ASSIST: 1 /HPF — SIGNIFICANT CHANGE UP (ref 0–4)
RSV RNA NPH QL NAA+NON-PROBE: SIGNIFICANT CHANGE UP
SARS-COV-2 RNA SPEC QL NAA+PROBE: SIGNIFICANT CHANGE UP
SODIUM SERPL-SCNC: 136 MMOL/L — SIGNIFICANT CHANGE UP (ref 135–145)
SP GR SPEC: 1.02 — SIGNIFICANT CHANGE UP (ref 1–1.03)
SQUAMOUS # UR AUTO: 0 /HPF — SIGNIFICANT CHANGE UP (ref 0–5)
TROPONIN T, HIGH SENSITIVITY RESULT: 22 NG/L — SIGNIFICANT CHANGE UP
TROPONIN T, HIGH SENSITIVITY RESULT: 25 NG/L — SIGNIFICANT CHANGE UP
UROBILINOGEN FLD QL: 1 MG/DL — SIGNIFICANT CHANGE UP (ref 0.2–1)
WBC # BLD: 7.48 K/UL — SIGNIFICANT CHANGE UP (ref 3.8–10.5)
WBC # FLD AUTO: 7.48 K/UL — SIGNIFICANT CHANGE UP (ref 3.8–10.5)
WBC UR QL: 0 /HPF — SIGNIFICANT CHANGE UP (ref 0–5)

## 2024-02-27 PROCEDURE — 99285 EMERGENCY DEPT VISIT HI MDM: CPT | Mod: GC

## 2024-02-27 PROCEDURE — 71045 X-RAY EXAM CHEST 1 VIEW: CPT | Mod: 26

## 2024-02-27 PROCEDURE — 99215 OFFICE O/P EST HI 40 MIN: CPT

## 2024-02-27 PROCEDURE — 70450 CT HEAD/BRAIN W/O DYE: CPT | Mod: 26,MC

## 2024-02-27 PROCEDURE — 99223 1ST HOSP IP/OBS HIGH 75: CPT

## 2024-02-27 PROCEDURE — 93000 ELECTROCARDIOGRAM COMPLETE: CPT | Mod: PD

## 2024-02-27 PROCEDURE — 93010 ELECTROCARDIOGRAM REPORT: CPT

## 2024-02-27 RX ORDER — ATENOLOL 25 MG/1
25 TABLET ORAL
Refills: 0 | Status: DISCONTINUED | OUTPATIENT
Start: 2024-02-27 | End: 2024-02-28

## 2024-02-27 RX ORDER — ATENOLOL 25 MG/1
50 TABLET ORAL AT BEDTIME
Refills: 0 | Status: DISCONTINUED | OUTPATIENT
Start: 2024-02-27 | End: 2024-02-28

## 2024-02-27 RX ORDER — ROSUVASTATIN CALCIUM 5 MG/1
1 TABLET ORAL
Refills: 0 | DISCHARGE

## 2024-02-27 RX ORDER — ASPIRIN/CALCIUM CARB/MAGNESIUM 324 MG
1 TABLET ORAL
Refills: 0 | DISCHARGE

## 2024-02-27 RX ORDER — DILTIAZEM HCL 120 MG
1 CAPSULE, EXT RELEASE 24 HR ORAL
Qty: 0 | Refills: 0 | DISCHARGE

## 2024-02-27 RX ORDER — ESCITALOPRAM OXALATE 10 MG/1
1 TABLET, FILM COATED ORAL
Refills: 0 | DISCHARGE

## 2024-02-27 RX ORDER — ALLOPURINOL 300 MG
200 TABLET ORAL DAILY
Refills: 0 | Status: DISCONTINUED | OUTPATIENT
Start: 2024-02-27 | End: 2024-02-28

## 2024-02-27 RX ORDER — HALOPERIDOL DECANOATE 100 MG/ML
2.5 INJECTION INTRAMUSCULAR EVERY 6 HOURS
Refills: 0 | Status: DISCONTINUED | OUTPATIENT
Start: 2024-02-27 | End: 2024-02-28

## 2024-02-27 RX ORDER — LANOLIN ALCOHOL/MO/W.PET/CERES
3 CREAM (GRAM) TOPICAL AT BEDTIME
Refills: 0 | Status: DISCONTINUED | OUTPATIENT
Start: 2024-02-27 | End: 2024-02-27

## 2024-02-27 RX ORDER — OLANZAPINE 15 MG/1
5 TABLET, FILM COATED ORAL ONCE
Refills: 0 | Status: COMPLETED | OUTPATIENT
Start: 2024-02-27 | End: 2024-02-27

## 2024-02-27 RX ORDER — TRIAMTERENE/HYDROCHLOROTHIAZID 75 MG-50MG
1 TABLET ORAL DAILY
Refills: 0 | Status: DISCONTINUED | OUTPATIENT
Start: 2024-02-28 | End: 2024-02-28

## 2024-02-27 RX ORDER — ATENOLOL 25 MG/1
1 TABLET ORAL
Refills: 0 | DISCHARGE

## 2024-02-27 RX ORDER — ATORVASTATIN CALCIUM 80 MG/1
40 TABLET, FILM COATED ORAL AT BEDTIME
Refills: 0 | Status: DISCONTINUED | OUTPATIENT
Start: 2024-02-27 | End: 2024-02-28

## 2024-02-27 RX ORDER — ESCITALOPRAM OXALATE 10 MG/1
10 TABLET, FILM COATED ORAL DAILY
Refills: 0 | Status: DISCONTINUED | OUTPATIENT
Start: 2024-02-27 | End: 2024-02-28

## 2024-02-27 RX ORDER — SODIUM CHLORIDE 9 MG/ML
1000 INJECTION INTRAMUSCULAR; INTRAVENOUS; SUBCUTANEOUS ONCE
Refills: 0 | Status: COMPLETED | OUTPATIENT
Start: 2024-02-27 | End: 2024-02-27

## 2024-02-27 RX ORDER — OLANZAPINE 15 MG/1
5 TABLET, FILM COATED ORAL ONCE
Refills: 0 | Status: DISCONTINUED | OUTPATIENT
Start: 2024-02-27 | End: 2024-02-27

## 2024-02-27 RX ORDER — LOSARTAN POTASSIUM 100 MG/1
100 TABLET, FILM COATED ORAL DAILY
Refills: 0 | Status: DISCONTINUED | OUTPATIENT
Start: 2024-02-28 | End: 2024-02-28

## 2024-02-27 RX ORDER — DIAZOXIDE 100 %
0 POWDER (GRAM) MISCELLANEOUS
Qty: 0 | Refills: 0 | DISCHARGE

## 2024-02-27 RX ORDER — TRIAMTERENE/HYDROCHLOROTHIAZID 75 MG-50MG
1 TABLET ORAL
Refills: 0 | DISCHARGE

## 2024-02-27 RX ORDER — CANDESARTAN CILEXETIL 8 MG/1
1 TABLET ORAL
Refills: 0 | DISCHARGE

## 2024-02-27 RX ORDER — ACETAMINOPHEN 500 MG
650 TABLET ORAL EVERY 6 HOURS
Refills: 0 | Status: DISCONTINUED | OUTPATIENT
Start: 2024-02-27 | End: 2024-02-28

## 2024-02-27 RX ORDER — BACLOFEN 100 %
2.5 POWDER (GRAM) MISCELLANEOUS EVERY 8 HOURS
Refills: 0 | Status: DISCONTINUED | OUTPATIENT
Start: 2024-02-27 | End: 2024-02-28

## 2024-02-27 RX ORDER — TRAZODONE HCL 50 MG
50 TABLET ORAL AT BEDTIME
Refills: 0 | Status: DISCONTINUED | OUTPATIENT
Start: 2024-02-27 | End: 2024-02-28

## 2024-02-27 RX ORDER — ALLOPURINOL 300 MG
2 TABLET ORAL
Refills: 0 | DISCHARGE

## 2024-02-27 RX ORDER — ASPIRIN/CALCIUM CARB/MAGNESIUM 324 MG
325 TABLET ORAL DAILY
Refills: 0 | Status: DISCONTINUED | OUTPATIENT
Start: 2024-02-27 | End: 2024-02-28

## 2024-02-27 RX ORDER — BACLOFEN 100 %
1 POWDER (GRAM) MISCELLANEOUS
Refills: 0 | DISCHARGE

## 2024-02-27 RX ORDER — HEPARIN SODIUM 5000 [USP'U]/ML
5000 INJECTION INTRAVENOUS; SUBCUTANEOUS EVERY 12 HOURS
Refills: 0 | Status: DISCONTINUED | OUTPATIENT
Start: 2024-02-27 | End: 2024-02-28

## 2024-02-27 RX ORDER — ZOLPIDEM TARTRATE 10 MG/1
1 TABLET ORAL
Refills: 0 | DISCHARGE

## 2024-02-27 RX ADMIN — SODIUM CHLORIDE 1000 MILLILITER(S): 9 INJECTION INTRAMUSCULAR; INTRAVENOUS; SUBCUTANEOUS at 14:31

## 2024-02-27 RX ADMIN — ATORVASTATIN CALCIUM 40 MILLIGRAM(S): 80 TABLET, FILM COATED ORAL at 22:20

## 2024-02-27 RX ADMIN — ATENOLOL 50 MILLIGRAM(S): 25 TABLET ORAL at 22:20

## 2024-02-27 NOTE — CHART NOTE - NSCHARTNOTEFT_GEN_A_CORE
I have personally reviewed this patient's ISTOP, reference #[id number]    A	N		08/29/2023	08/29/2023	zolpidem tartrate 5 mg tablet	30	30	Kiran Griffith MD	[id number]	Medicare	[id number]  A	N	B	04/24/2023	04/29/2023	diazepam 5 mg tablet	60	30	Kiran Griffith MD	[id number]	Medicare	[id number]

## 2024-02-27 NOTE — H&P ADULT - PROBLEM SELECTOR PLAN 3
Mild pulmonary edema on CXR. Mild crackles at bases but denies SOB and no LE edema. Probnp 335 and not significantly elevated. Hold off on lasix for now pending echo  -echo ordered

## 2024-02-27 NOTE — ED PROVIDER NOTE - ATTENDING CONTRIBUTION TO CARE
Dr. Tripp:  I have personally performed a face to face bedside history and physical examination of this patient. I have discussed the history, examination, review of systems, assessment and plan of management with the resident. I have reviewed the electronic medical record and amended it to reflect my history, review of systems, physical exam, assessment and plan.    78M h/o DM, depression, HTN, CAD, "balance disorder", mild cognitive impairment, presents after syncope and fall yesterday.  Pt was going down stairs and synopsized on the landing.  Wife witnessed fall.   Ambulatory since fall.  Gait seems more shuffling over past weeks per wife.      Exam:  - nad  - rrr  - ctab  - abd soft ntnd  - no focal neuro deficits grossly on exam    A/P  - syncope and fall  - cbc, cmp, trop, coags, ekg, CT head, CXR

## 2024-02-27 NOTE — H&P ADULT - PROBLEM SELECTOR PLAN 2
Gait difficulty with mild cognitive impairment. Possibly early signs of dementia. Has had multiple falls and patient not following up with neurology as outpatient. No hx CVA. On exam no focal deficits. Last MRI brain 5/2023 with chronic microvascular changes and impression: No evidence for intracranial mass, acute territorial infarct, acute intracranial hemorrhage, or midline shift.  CTH on admission: There is diffuse cerebral volume loss with prominence of the sulci, fissures, and cisternal spaces which is normal for the patient's age. There is minimal deep and periventricular white matter hypoattenuation   statistically compatible with microvascular change    -neurology consulted and to see patient  -B12, folate, syphilis, thiamine, ammonia, A1C, CK given MCI/gait instability. Early dementia on the differential  -PT eval Gait difficulty with mild cognitive impairment. Possibly early signs of dementia. Has had multiple falls and patient not following up with neurology as outpatient. No hx CVA. On exam no focal deficits. Last MRI brain 5/2023 with chronic microvascular changes and impression: No evidence for intracranial mass, acute territorial infarct, acute intracranial hemorrhage, or midline shift.  CTH on admission: There is diffuse cerebral volume loss with prominence of the sulci, fissures, and cisternal spaces which is normal for the patient's age. There is minimal deep and periventricular white matter hypoattenuation   statistically compatible with microvascular change    -neurology consulted and to see patient  -B12, folate, syphilis, thiamine, ammonia, A1C, CK given MCI/gait instability. Early dementia on the differential  -PT eval  -Xray left shoulder for fall a couple weeks ago

## 2024-02-27 NOTE — H&P ADULT - HISTORY OF PRESENT ILLNESS
78M with PMHx HTN, CAD s/p stent ~30 years ago, CKD?, MCI?, chronic urinary incontinence, tubular adenoma presenting with possible syncope at home. Collateral obtained by wife at bedside Esther and son Kaz. The patient has had months of difficulty ambulating and gait shuffling. He also has been having waxing and waning confusion with periods of agitation. He often times is awake all night and periodically takes ambien PRN for sleep. Two weeks ago he had a fall in the bathroom onto his L shoulder but did not seek medical attention. Last night he was at the top of the stairs when he collapsed. Per wife he was unresponsive for a seconds to minutes. It took him a few minutes to get back to mental status baseline and get back up standing. The patient himself denies ever passing out but is unable to recall all events. Per wife he did not have any tonic/clonic jerking, urinary/bowel incontinence, tongue biting or hx seizures. Family denies recent fevers, chills, cough, CP, SOB, palpitations, LH, dizziness, abdominal pain, vomiting, diarrhea, dysuria. Patient has hx of stent ~30 years ago but no known hx CHF.

## 2024-02-27 NOTE — H&P ADULT - PROBLEM SELECTOR PLAN 9
HSQ  DASH/TLC diet HSQ  DASH/TLC diet  Contact: Esther (wife) [phone]  Yefri (son) [phone]  PT conrado

## 2024-02-27 NOTE — H&P ADULT - PROBLEM SELECTOR PLAN 1
Possible syncope given wife's report of unresponsiveness. Patient denies syncope. No reported seizure-like activity  -tele, may require longer term tele monitoring if recurrent syncope  -troponin 25--22, EKG NSR RBBB (old), no ST changes. Not ACS  -CTH negative for acute findings  -PT eval  -orthostatics  -CXR mild pulm edema - echo to r/o CHF

## 2024-02-27 NOTE — ED ADULT TRIAGE NOTE - CHIEF COMPLAINT QUOTE
pt coming from [facility] office for left shoulder pain s/p fall last wk, as per EMS for possibly syncope episode early today had an EKG at [facility] office pt was sent r/o cardiac.   Prod. cough note in triage. pt coming from [facility] office for left shoulder pain s/p fall last wk, as per EMS for possibly syncope episode yest. at 11PM  had an EKG at [facility] office pt was sent r/o cardiac.   Prod. cough note in triage.

## 2024-02-27 NOTE — ED PROVIDER NOTE - PHYSICAL EXAMINATION
Exam as stated below:   CONSTITUTIONAL: In NAD.   SKIN: Warm dry. No rashes noted.   EYES: No scleral icterus. Conjunctiva pink.  ENT: Posterior pharynx with no erythema.  NECK: No ttp.    CARD: RRR. No murmurs.  RESP: Clear to ausculation b/l. No Crackles noted. slight end-exp Wheezing noted.  ABD: Soft. Non-tender. Not distended.   MSK: No pedal edema. No calf tenderness.  NEURO:   Patient alert and oriented x 2, poor short-term memory.   Patient with hyporeflexia in his lower extremities at the patellar reflexes.  Strength in the upper and lower extremities bilaterally weak on flexion and extension.  PSYCH: Cooperative, appropriate.

## 2024-02-27 NOTE — ED PROVIDER NOTE - CLINICAL SUMMARY MEDICAL DECISION MAKING FREE TEXT BOX
MDM/Summary/DDx (includes but is not limited to):  78-year-old history of depression hypertension CAD with a balance disorder, mild cognitive impairment coming after syncope and fall yesterday with more shuffling gait and more confusion since few weeks ago.  Shuffling gait noted going to the bathroom and unsteadiness on feet.  Exam and history is possibly consistent with a stroke versus TIA.  Exam and history is possibly consistent with neurocognitive deficit or pathology in the setting of possible Parkinson's or Alzheimer's.  Could be arrhythmia versus lecture light abnormalities.  Could be dehydration.  Could be some infection including but not limited to UTI or pneumonia.  Flu COVID possible.  Labs: cbc cmp ua uc pt ptt ts  flucovid   Imaging: CTH XR chest   Tx: Supportive, pain/nausea medications as pt requires/requests.  Consults/Resources: none at this time    Dispo: admit     Triage note reviewed. VS reviewed. EKG reviewed and documented in "RESULTS" section, if possible at given time.     DDx in MDM includes the most likely ddx, but is not limited to solely what is listed. Clinical course may alter/deviate from the above plan. When possible, progress notes written, as needed, and are included in "PROGRESS NOTE" section below.       Medical, family, and social determinants of health reviewed and discussed w/ pt/family/caretaker, when allowable, and is incorporated into note above, whenever possible.

## 2024-02-27 NOTE — ED ADULT NURSE NOTE - OBJECTIVE STATEMENT
Patient A&o X4, history of HTN and HLD, received in room 20, with complaints of syncope. Patient states, "I was sent here by my doctor to rule out an infection". As per family member, patient was ambulating after dinner when he passed out. Family admits to syncopal episode being witnessed, denies patient hitting head or use of blood thinners. Patient admits to feeling weak and tired at this time. Patient denies any pain or further medical complaints. Patient able to speak in clear sentences, respirations equal and unlabored. Lung sounds clear b/l, equal chest rise and fall noted. Denies CP/SOB, fever, chills, nausea, vomiting and diarrhea at this time. Skin warm and dry. Provider at bedside for eval, pending further orders. Patient A&o X4 with intermittent periods of confusion, history of HTN and HLD, received in room 20, with complaints of syncope. Patient states, "I was sent here by my doctor to rule out an infection". As per family member, patient was ambulating after dinner when he passed out. Family admits to syncopal episode being witnessed, denies patient hitting head or use of blood thinners. Patient admits to feeling weak and tired at this time. Patient denies any pain or further medical complaints. Patient able to speak in clear sentences, respirations equal and unlabored. Lung sounds clear b/l, equal chest rise and fall noted. Denies CP/SOB, fever, chills, nausea, vomiting and diarrhea at this time. Skin warm and dry. Provider at bedside for eval, pending further orders.

## 2024-02-27 NOTE — ED ADULT NURSE NOTE - NSFALLRISKINTERV_ED_ALL_ED
Assistance OOB with selected safe patient handling equipment if applicable/Assistance with ambulation/Communicate fall risk and risk factors to all staff, patient, and family/Orthostatic vital signs/Provide visual cue: yellow wristband, yellow gown, etc/Reinforce activity limits and safety measures with patient and family/Call bell, personal items and telephone in reach/Instruct patient to call for assistance before getting out of bed/chair/stretcher/Non-slip footwear applied when patient is off stretcher/[place] to call system/Physically safe environment - no spills, clutter or unnecessary equipment/Purposeful Proactive Rounding/Room/bathroom lighting operational, light cord in reach

## 2024-02-27 NOTE — REVIEW OF SYSTEMS
[Fatigue] : fatigue [Incontinence] : incontinence [Negative] : Respiratory [Chest Pain] : no chest pain [Palpitations] : no palpitations [Lower Ext Edema] : no lower extremity edema [FreeTextEntry9] : Bradykinesia

## 2024-02-27 NOTE — PATIENT PROFILE ADULT - FALL HARM RISK - HARM RISK INTERVENTIONS
Assistance with ambulation/Assistance OOB with selected safe patient handling equipment/Communicate Risk of Fall with Harm to all staff/Discuss with provider need for PT consult/Monitor gait and stability/Reinforce activity limits and safety measures with patient and family/Tailored Fall Risk Interventions/Use of alarms - bed, chair and/or voice tab/Visual Cue: Yellow wristband and red socks/Bed in lowest position, wheels locked, appropriate side rails in place/Call bell, personal items and telephone in reach/Instruct patient to call for assistance before getting out of bed or chair/Non-slip footwear when patient is out of bed/[place] to call system/Physically safe environment - no spills, clutter or unnecessary equipment/Purposeful Proactive Rounding/Room/bathroom lighting operational, light cord in reach

## 2024-02-27 NOTE — HISTORY OF PRESENT ILLNESS
[FreeTextEntry8] : This is an 78-year-old male who is brought in by his wife.  He apparently had an episode of syncope yesterday and refused to go to the emergency room either last night or this morning.  He has a history of ASHD and trifascicular block  History is taken from his wife as the patient is somewhat forgetful.  She states that last night he was at the top of the stairs she heard a thud and found him on the floor.  He was unarousable for approximately 30 seconds.  No complaints of chest pain shortness of breath or recent infection

## 2024-02-27 NOTE — ED ADULT NURSE NOTE - CHIEF COMPLAINT QUOTE
pt coming from [facility] office for left shoulder pain s/p fall last wk, as per EMS for possibly syncope episode yest. at 11PM  had an EKG at [facility] office pt was sent r/o cardiac.   Prod. cough note in triage.

## 2024-02-27 NOTE — ED ADULT NURSE REASSESSMENT NOTE - NS ED NURSE REASSESS COMMENT FT1
break coverage rn. received report from MANUEL shin. pt A&Ox4, vitally stable. denies chest pain, SOB,n/v,headache, dizziness, numbness/tingling to hands/feet. sent rpt trop awaiting CT scan

## 2024-02-27 NOTE — H&P ADULT - NSICDXPASTMEDICALHX_GEN_ALL_CORE_FT
PAST MEDICAL HISTORY:  CAD (coronary artery disease)     Hyperlipemia     Hypertension     Mild cognitive impairment

## 2024-02-27 NOTE — H&P ADULT - NSHPLABSRESULTS_GEN_ALL_CORE
Personally reviewed labs:                        15.7   7.48  )-----------( 124      ( 2024 14:30 )             46.3     24 @ 14:30    136  |  99  |  23             --------------------------< 111<H>     4.0  |  25  | 1.49<H>    eGFR AA: --  eGFR N-AA: --    Calcium: 9.8  Phosphorus: 3.1  Magnesium: 2.10    AST: 23    ALT: 19  AlkPhos: 74  Protein: 7.5  Albumin: 4.2  TBili: 0.4  D-Bili: --    Urinalysis Basic - ( 2024 14:30 )    Color: Dark Yellow / Appearance: Clear / S.021 / pH: x  Gluc: 111 mg/dL / Ketone: Trace mg/dL  / Bili: Negative / Urobili: 1.0 mg/dL   Blood: x / Protein: Negative mg/dL / Nitrite: Negative   Leuk Esterase: Negative / RBC: 1 /HPF / WBC 0 /HPF   Sq Epi: x / Non Sq Epi: 0 /HPF / Bacteria: Negative /HPF          RADIOLOGY & ADDITIONAL TESTS:    EKG my independent interpretation: NSR, RBBB, left axis deviation, no ST changes      Imaging personally reviewed:  CXR:  IMPRESSION:  No focal consolidation to suggest pneumonia.  Mild pulmonary edema.    CTH:  FINDINGS: There is no acute intracranial hemorrhage, mass effect, shift   of the midline structures, herniation, extra-axial fluid collection, or   hydrocephalus.    There is diffuse cerebral volume loss with prominence of the sulci,   fissures, and cisternal spaces which is normal for the patient's age.   There is minimal deep and periventricular white matter hypoattenuation   statistically compatible with microvascular changes given calcific   atherosclerotic disease of the intracranial arteries.    A small amount of aerated secretions are seen within the left maxillary   sinus. Additional mild scattered code thickening is seen throughout the   ethmoid complex. The tympanomastoid cavities are clear. The calvarium is   intact. The imaged orbits are unremarkable.    IMPRESSION: No acute intracranial hemorrhage, mass effect, or shift of   the midline structures.

## 2024-02-27 NOTE — H&P ADULT - NSHPPHYSICALEXAM_GEN_ALL_CORE
PHYSICAL EXAM:  Vital Signs Last 24 Hrs  T(C): 36.6 (02-27-24 @ 21:24)  T(F): 97.9 (02-27-24 @ 21:24), Max: 97.9 (02-27-24 @ 13:39)  HR: 64 (02-27-24 @ 21:24) (62 - 67)  BP: 166/92 (02-27-24 @ 21:24)  BP(mean): --  RR: 18 (02-27-24 @ 21:24) (15 - 22)  SpO2: 96% (02-27-24 @ 21:24) (94% - 97%)  Wt(kg): --    Constitutional: NAD, awake and alert, well developed  EYES: EOMI, conjunctiva clear  ENT:  Normal Hearing, no tonsillar exudates   Neck: Soft and supple , no thyromegaly   Respiratory: mild crackles at bases, No wheezing or rhonchi, no tachypnea, no accessory muscle use  Cardiovascular: S1 and S2, regular rate and rhythm, no Murmurs, gallops or rubs, no JVD, no leg edema  Gastrointestinal: Bowel Sounds present, soft, nontender, nondistended, no guarding, no rebound  Extremities: No cyanosis or clubbing; warm to touch  Neurological: Oriented x4, No focal deficits, CN II-XII intact bilaterally, sensation to light touch intact in all extremities. No cogwheel rigidity  Musculoskeletal: 5/5 strength b/l upper and lower extremities; no joint swelling.  Skin: No rashes, no ulcerations

## 2024-02-27 NOTE — ED PROVIDER NOTE - OBJECTIVE STATEMENT
HPI & ROS: 78-year-old male with a history of hypertension, syncope and collapse, JU, CAD, hypercholesterolemia, mild cognitive impairment coming in with syncope last night.  Last night the patient was going down the stairs after being standing for an extended period of time, and then  the wife witnessed him collapse without hitting his head at the bottom of the staircase.  Only on aspirin.  He lost consciousness per the wife, for approximately 5 minutes.  There was no twitching.  The patient did not go to the bathroom on himself, patient did not bite tongue.  Patient was confused after waking up for approximately 5 minutes.  And then was able to assist the wife and picking him up.  No recent chills nausea vomiting.  Chest pain and shortness of breath.  Good p.o. intake.  Last stress test was approximately 1 year ago and was normal.   Wife at bedside notes that he has been slower in the past few weeks after fall approximately 2 weeks ago.  States he has been shuffling his gait for longer than 1 month.  And has been more confused in the past few weeks than normal.

## 2024-02-27 NOTE — CONSULT NOTE ADULT - ATTENDING COMMENTS
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 porgressive shuffling gait and cognitive changes over the past year  O/E AAO x3, recall 2/3, grasp and snout reflex Motor 5/5 slow shuffling gait    Impression: suspect underlying neurodegenerative process MCI vs mild dementia    Can consider CT Cervical and lumbar spine, does not need to delay DC if otherwise ready to go  rIsks of prolonging length of stay to w/u chronic neurologic do not outweigh benefit   PT  -B12, folate, TSH   -In order to enhance patient's overall well-being and clinical course, please try avoiding benzodiazepines, anticholinergics, and antihistamines (Can cause worsening confusion/delirium). Additionally, continue reorientation, supportive care, maintaining regular sleep/wake cycle, and optimizing nutritional/medical factors.   Can follow up with Neurology, Dr. Tex Meredith at [phone]

## 2024-02-27 NOTE — H&P ADULT - PROBLEM SELECTOR PLAN 6
Monitor: The problem is improving with lifestyle modifications. Weight gain noted.  Evaluation: Labs/tests ordered, see encounter summary.  Assessment/Treatment:  Continue current treatment/monitoring regimen.   Resume ASA, statin, BB

## 2024-02-27 NOTE — PHYSICAL EXAM
[No JVD] : no jugular venous distention [Normal] : normal rate, regular rhythm, normal S1 and S2 and no murmur heard [No Edema] : there was no peripheral edema [de-identified] : Bradykinesia [de-identified] : Bradykinesia and tremor

## 2024-02-27 NOTE — CONSULT NOTE ADULT - ASSESSMENT
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 Neurology consulted per family request for concern for gait changes and concern for cognitive decline.    Spoke with patient's wife over the phone. Patient's wife reports patient was about to go down the stairs, fell down before he reached the stairs, did not land on his head. Reports he was "unable to respond" to her for some time, described as not being able to help in getting himself off the ground, lasting about 5-7 minutes per wife. She states she was trying to direct him to help her help him, but he wasn't able to follow what she was asking him to do. She states he could not control his body or legs or arms. She denied any seizure-like convulsive activity. Patient did not urinate himself. Wife states this is not the first time he had a fall. He fell once before, injured his left arm. Wife states patient's gait is shuffling and slow. He merissa when he walks and has to hold on to the wall. He does not use a cane or a walker. She is unable to tell if he has freezing of his gait. Wife reports patient's balance has been off lately, especially over last 3 months. He has been stumbling more often. He does not have constipation but does have times where he is  unable to reach the bathroom in time and has urinary incontinence. No episodes of waking up with the bed wet. Per wife, patient lately has not had great memory, forgets names. She is unable to describe memory issues further, but does mention that patient sometimes has "no filter" when he talks, reports patient can be disinhibited or impulsive. Wife does the shopping, cooking, bills for the past year. Patient is using the bathroom on his own, but is having difficulty with showering and with wiping, especially after the injury to his L arm. Per wife, patient has been gradually, slowly declining in mentation and function over the last year. He follows with a PCP but refuses to follow with a neurologist. Patient tries to hide his symptoms from his wife and minimizes any complaints in front of her.     Patient seen at bedside by neurology. Patient endorses unsteadiness while walking, ongoing for last year or so. He states he had a fall while attempting to go down the stairs today. He denies injury to the head. He states this is not the first time episode and he has had prior falls, once with injury to the left shoulder. He denies prolonged confusion after his falls. No lightheadedness, dizziness, hot flashes, palpitations, headache, leg pain, focal leg weakness prior to the fall. Patient reports he remembers the fall, denies LOC.   He denies drinking alcohol and endorses occasional cigar use socially.   He endorses hx of cardiac procedure in past, but denies prior surgical hx.  Patient is on allopurinol for high uric acid, anti-hypertensives, and cholesterol medication.   Denies allergies. No recent travel. FH of diabetes.     Overall, non-focal neuro exam. Gait to be assessed. MOCA 25/29 (did not test list of letters due to difficulty hearing) (-3 visuospatial/executive, -1 naming, -2 delayed recall)  CTH No acute intracranial hemorrhage, mass effect, or shift of the midline structures.    Plan:   [] cardiac workup per primary team.   [] orthostatic vitals, ekg  [] Gait evaluation in AM  [] PT/OT  [] Lipid panel, HbA1c, CBC, CMP, coagulation panel, troponin  [] DVT prophylaxis per primary team  [] bedside swallow eval  [] goal gradual normotension over 2-3 days   [] BGM goals 140-180  [] Patient can follow up with Neurology upon discharge, Dr. Tex Meredith, [address], [place], [place]; ([phone]).    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 Neurology consulted per family request for concern for gait changes and concern for cognitive decline.    Spoke with patient's wife over the phone. Patient's wife reports patient was about to go down the stairs, fell down before he reached the stairs, did not land on his head. Reports he was "unable to respond" to her for some time, described as not being able to help in getting himself off the ground, lasting about 5-7 minutes per wife. She states she was trying to direct him to help her help him, but he wasn't able to follow what she was asking him to do. She states he could not control his body or legs or arms. She denied any seizure-like convulsive activity. Patient did not urinate himself. Wife states this is not the first time he had a fall. He fell once before, injured his left arm. Wife states patient's gait is shuffling and slow. He merissa when he walks and has to hold on to the wall. He does not use a cane or a walker. She is unable to tell if he has freezing of his gait. Wife reports patient's balance has been off lately, especially over last 3 months. He has been stumbling more often. He does not have constipation but does have times where he is  unable to reach the bathroom in time and has urinary incontinence. No episodes of waking up with the bed wet. Per wife, patient lately has not had great memory, forgets names. She is unable to describe memory issues further, but does mention that patient sometimes has "no filter" when he talks, reports patient can be disinhibited or impulsive. Wife does the shopping, cooking, bills for the past year. Patient is using the bathroom on his own, but is having difficulty with showering and with wiping, especially after the injury to his L arm. Per wife, patient has been gradually, slowly declining in mentation and function over the last year. He follows with a PCP but refuses to follow with a neurologist. Patient tries to hide his symptoms from his wife and minimizes any complaints in front of her.     Patient seen at bedside by neurology. Patient endorses unsteadiness while walking, ongoing for last year or so. He states he had a fall while attempting to go down the stairs today. He denies injury to the head. He states this is not the first time episode and he has had prior falls, once with injury to the left shoulder. He denies prolonged confusion after his falls. No lightheadedness, dizziness, hot flashes, palpitations, headache, leg pain, focal leg weakness prior to the fall. Patient reports he remembers the fall, denies LOC.   He denies drinking alcohol and endorses occasional cigar use socially.   He endorses hx of cardiac procedure in past, but denies prior surgical hx.  Patient is on allopurinol for high uric acid, anti-hypertensives, and cholesterol medication.   Denies allergies. No recent travel. FH of diabetes.     Overall, non-focal neuro exam. Gait to be assessed. MOCA 25/29 (did not test list of letters due to difficulty hearing) (-3 visuospatial/executive, -1 naming, -2 delayed recall)  CTH No acute intracranial hemorrhage, mass effect, or shift of the midline structures.    Impression: Reported gait difficulty and cognitive impairment, ongoing for past year, outpatient assessment recommended for further evaluation    Plan:   [] Gait evaluation in AM  [] cardiac workup per primary team.   [] orthostatic vitals, ekg  [] PT/OT  [] Lipid panel, HbA1c, CBC, CMP, coagulation panel, troponin  [] DVT prophylaxis per primary team  [] bedside swallow eval  [] goal gradual normotension over 2-3 days   [] BGM goals 140-180  [] Patient can follow up with Neurology upon discharge, Dr. Tex Meredith, [address], [place], [place]; ([phone]).    Case and plan not final until attending attestation

## 2024-02-27 NOTE — H&P ADULT - ASSESSMENT
78M with PMHx HTN, CAD s/p stent ~30 years ago, CKD?, MCI?, chronic urinary incontinence, tubular adenoma presenting with possible syncope at home.

## 2024-02-27 NOTE — H&P ADULT - PROBLEM SELECTOR PLAN 8
Hx tubular adenoma on 3/2021 C-scope  -patient will be due for repeat C-scope as outpatient with GI for surveillance

## 2024-02-27 NOTE — ADDENDUM
[FreeTextEntry1] : I spent 45 minutes face-to-face reviewing records EKG arranging ambulance arranging cardiology consult arranging admission

## 2024-02-27 NOTE — ASSESSMENT
[FreeTextEntry1] : This is an 78-year-old male who is brought in by his wife.  He apparently had an episode of syncope yesterday and refused to go to the emergency room either last night or this morning.  He has a history of ASHD and trifascicular block  History is taken from his wife as the patient is somewhat forgetful.  She states that last night he was at the top of the stairs she heard a thud and found him on the floor.  He was unarousable for approximately 30 seconds.  No complaints of chest pain shortness of breath or recent infection  Patient's physical examination is notable for a tremor and some bradykinesia which I believe is parkinsonian  He had mild orthostatic hypotension  In addition had some bibasilar Rales on the right  After much discussion he finally agreed to go to the emergency room.  I will call cardiology  His EKG remains with Right bundle aditi block and left anterior hemiblock.  In addition to his sinus bradycardia

## 2024-02-27 NOTE — H&P ADULT - TIME BILLING
I had a face to face encounter with this patient. I spent 78 total minutes on the bedside interview and examination, discussion with family at bedside, discussion with neurology consult, coordination of care, counseling, chart review, order placement and documentation for this patient.

## 2024-02-28 ENCOUNTER — TRANSCRIPTION ENCOUNTER (OUTPATIENT)
Age: 79
End: 2024-02-28

## 2024-02-28 ENCOUNTER — RESULT REVIEW (OUTPATIENT)
Age: 79
End: 2024-02-28

## 2024-02-28 ENCOUNTER — APPOINTMENT (OUTPATIENT)
Dept: INTERNAL MEDICINE | Facility: CLINIC | Age: 79
End: 2024-02-28

## 2024-02-28 VITALS
SYSTOLIC BLOOD PRESSURE: 162 MMHG | DIASTOLIC BLOOD PRESSURE: 82 MMHG | TEMPERATURE: 98 F | RESPIRATION RATE: 16 BRPM | OXYGEN SATURATION: 95 % | HEART RATE: 68 BPM

## 2024-02-28 LAB
A1C WITH ESTIMATED AVERAGE GLUCOSE RESULT: 5.9 % — HIGH (ref 4–5.6)
ALBUMIN SERPL ELPH-MCNC: 3.8 G/DL — SIGNIFICANT CHANGE UP (ref 3.3–5)
ALP SERPL-CCNC: 67 U/L — SIGNIFICANT CHANGE UP (ref 40–120)
ALT FLD-CCNC: 18 U/L — SIGNIFICANT CHANGE UP (ref 4–41)
AMMONIA BLD-MCNC: 26 UMOL/L — SIGNIFICANT CHANGE UP (ref 11–55)
ANION GAP SERPL CALC-SCNC: 13 MMOL/L — SIGNIFICANT CHANGE UP (ref 7–14)
AST SERPL-CCNC: 26 U/L — SIGNIFICANT CHANGE UP (ref 4–40)
BASOPHILS # BLD AUTO: 0.04 K/UL — SIGNIFICANT CHANGE UP (ref 0–0.2)
BASOPHILS NFR BLD AUTO: 0.4 % — SIGNIFICANT CHANGE UP (ref 0–2)
BILIRUB SERPL-MCNC: 0.5 MG/DL — SIGNIFICANT CHANGE UP (ref 0.2–1.2)
BUN SERPL-MCNC: 19 MG/DL — SIGNIFICANT CHANGE UP (ref 7–23)
CALCIUM SERPL-MCNC: 9.4 MG/DL — SIGNIFICANT CHANGE UP (ref 8.4–10.5)
CHLORIDE SERPL-SCNC: 102 MMOL/L — SIGNIFICANT CHANGE UP (ref 98–107)
CK SERPL-CCNC: 209 U/L — HIGH (ref 30–200)
CO2 SERPL-SCNC: 22 MMOL/L — SIGNIFICANT CHANGE UP (ref 22–31)
CREAT SERPL-MCNC: 1.12 MG/DL — SIGNIFICANT CHANGE UP (ref 0.5–1.3)
CULTURE RESULTS: SIGNIFICANT CHANGE UP
EGFR: 66 ML/MIN/1.73M2 — SIGNIFICANT CHANGE UP
EOSINOPHIL # BLD AUTO: 0.26 K/UL — SIGNIFICANT CHANGE UP (ref 0–0.5)
EOSINOPHIL NFR BLD AUTO: 2.9 % — SIGNIFICANT CHANGE UP (ref 0–6)
ESTIMATED AVERAGE GLUCOSE: 123 — SIGNIFICANT CHANGE UP
FOLATE SERPL-MCNC: 8.4 NG/ML — SIGNIFICANT CHANGE UP (ref 3.1–17.5)
GLUCOSE SERPL-MCNC: 98 MG/DL — SIGNIFICANT CHANGE UP (ref 70–99)
HCT VFR BLD CALC: 45 % — SIGNIFICANT CHANGE UP (ref 39–50)
HGB BLD-MCNC: 15.5 G/DL — SIGNIFICANT CHANGE UP (ref 13–17)
IANC: 6.17 K/UL — SIGNIFICANT CHANGE UP (ref 1.8–7.4)
IMM GRANULOCYTES NFR BLD AUTO: 0.4 % — SIGNIFICANT CHANGE UP (ref 0–0.9)
LYMPHOCYTES # BLD AUTO: 1.55 K/UL — SIGNIFICANT CHANGE UP (ref 1–3.3)
LYMPHOCYTES # BLD AUTO: 17.2 % — SIGNIFICANT CHANGE UP (ref 13–44)
MCHC RBC-ENTMCNC: 33.5 PG — SIGNIFICANT CHANGE UP (ref 27–34)
MCHC RBC-ENTMCNC: 34.4 GM/DL — SIGNIFICANT CHANGE UP (ref 32–36)
MCV RBC AUTO: 97.2 FL — SIGNIFICANT CHANGE UP (ref 80–100)
MONOCYTES # BLD AUTO: 0.96 K/UL — HIGH (ref 0–0.9)
MONOCYTES NFR BLD AUTO: 10.6 % — SIGNIFICANT CHANGE UP (ref 2–14)
NEUTROPHILS # BLD AUTO: 6.17 K/UL — SIGNIFICANT CHANGE UP (ref 1.8–7.4)
NEUTROPHILS NFR BLD AUTO: 68.5 % — SIGNIFICANT CHANGE UP (ref 43–77)
NRBC # BLD: 0 /100 WBCS — SIGNIFICANT CHANGE UP (ref 0–0)
NRBC # FLD: 0 K/UL — SIGNIFICANT CHANGE UP (ref 0–0)
PLATELET # BLD AUTO: 116 K/UL — LOW (ref 150–400)
POTASSIUM SERPL-MCNC: 3.6 MMOL/L — SIGNIFICANT CHANGE UP (ref 3.5–5.3)
POTASSIUM SERPL-SCNC: 3.6 MMOL/L — SIGNIFICANT CHANGE UP (ref 3.5–5.3)
PROT SERPL-MCNC: 7.2 G/DL — SIGNIFICANT CHANGE UP (ref 6–8.3)
RBC # BLD: 4.63 M/UL — SIGNIFICANT CHANGE UP (ref 4.2–5.8)
RBC # FLD: 14.2 % — SIGNIFICANT CHANGE UP (ref 10.3–14.5)
SODIUM SERPL-SCNC: 137 MMOL/L — SIGNIFICANT CHANGE UP (ref 135–145)
SPECIMEN SOURCE: SIGNIFICANT CHANGE UP
T PALLIDUM AB TITR SER: NEGATIVE — SIGNIFICANT CHANGE UP
TSH SERPL-MCNC: 2.23 UIU/ML — SIGNIFICANT CHANGE UP (ref 0.27–4.2)
VIT B12 SERPL-MCNC: 373 PG/ML — SIGNIFICANT CHANGE UP (ref 200–900)
WBC # BLD: 9.02 K/UL — SIGNIFICANT CHANGE UP (ref 3.8–10.5)
WBC # FLD AUTO: 9.02 K/UL — SIGNIFICANT CHANGE UP (ref 3.8–10.5)

## 2024-02-28 PROCEDURE — 99222 1ST HOSP IP/OBS MODERATE 55: CPT | Mod: FS

## 2024-02-28 PROCEDURE — 99239 HOSP IP/OBS DSCHRG MGMT >30: CPT

## 2024-02-28 PROCEDURE — 93306 TTE W/DOPPLER COMPLETE: CPT | Mod: 26

## 2024-02-28 RX ADMIN — Medication 200 MILLIGRAM(S): at 09:35

## 2024-02-28 RX ADMIN — ATENOLOL 25 MILLIGRAM(S): 25 TABLET ORAL at 09:35

## 2024-02-28 RX ADMIN — Medication 325 MILLIGRAM(S): at 09:35

## 2024-02-28 RX ADMIN — Medication 1 TABLET(S): at 05:59

## 2024-02-28 RX ADMIN — HEPARIN SODIUM 5000 UNIT(S): 5000 INJECTION INTRAVENOUS; SUBCUTANEOUS at 17:21

## 2024-02-28 RX ADMIN — ESCITALOPRAM OXALATE 10 MILLIGRAM(S): 10 TABLET, FILM COATED ORAL at 09:36

## 2024-02-28 RX ADMIN — LOSARTAN POTASSIUM 100 MILLIGRAM(S): 100 TABLET, FILM COATED ORAL at 05:59

## 2024-02-28 NOTE — DISCHARGE NOTE PROVIDER - PROVIDER TOKENS
PROVIDER:[TOKEN:[[id number]]] PROVIDER:[TOKEN:[[id number]]],PROVIDER:[TOKEN:[[id number]]],PROVIDER:[TOKEN:[[id number]]]

## 2024-02-28 NOTE — PHYSICAL THERAPY INITIAL EVALUATION ADULT - ADDITIONAL COMMENTS
Pt lives with his wife in a house with no steps to enter and a flight inside to bed and bathroom. Pt did not use an assistive device and was independent with ADLs prior. Pt reports 1 fall in the past 6 months.   Pt left semi supine in bed in NAD, all lines intact, call [facility] in reach and MANUEL Anne made aware.

## 2024-02-28 NOTE — DIETITIAN INITIAL EVALUATION ADULT - OTHER INFO
78M with PMHx HTN, CAD s/p stent ~30 years ago, CKD?, MCI?, chronic urinary incontinence, tubular adenoma presenting with possible syncope at home, per chart.     Per PCA by bedside, patient consumed 75% of breakfast this morning. No reports of any difficulty chewing or swallowing noted at this time. No recent episode of any nausea, vomiting, diarrhea, constipation reported at this time. Per RN flow sheet, no bowel movement since admission(1d). Current weight: 82.4kg/181.6lbs (2/28, per RN flow sheet). Per Reynaldo CHING, weight history: 81.6kg (1/18/2023). No significant weight changes noted x 1 year and 1 month.  78M with PMHx HTN, CAD s/p stent ~30 years ago, CKD?, MCI?, chronic urinary incontinence, tubular adenoma presenting with possible syncope at home, per chart.     Per PCA by bedside, patient consumed 75% of breakfast this morning. No reports of any difficulty chewing or swallowing noted at this time. No recent episode of any nausea, vomiting, diarrhea, constipation reported at this time. Per RN flow sheet, no bowel movement since admission(1d). Monitor bowel movement. Current weight: 82.4kg/181.6lbs (2/28, per RN flow sheet). Per Reynaldo CHING, weight history: 81.6kg (1/18/2023). No significant weight changes noted x 1 year and 1 month.

## 2024-02-28 NOTE — DISCHARGE NOTE NURSING/CASE MANAGEMENT/SOCIAL WORK - PATIENT PORTAL LINK FT
You can access the FollowMyHealth Patient Portal offered by [facility] by registering at the following website: [email or web address]. By joining [facility]’s FollowMyHealth portal, you will also be able to view your health information using other applications (apps) compatible with our system.

## 2024-02-28 NOTE — SBIRT NOTE ADULT - NSSBIRTALCPOSREINDET_GEN_A_CORE
Met with pt and reviewed responses.  Provided positive reinforcement as pt reports that he only has one drink on a special occasion.

## 2024-02-28 NOTE — DISCHARGE NOTE PROVIDER - CARE PROVIDERS DIRECT ADDRESSES
,DirectAddress_Unknown ,DirectAddress_Unknown,[email or web address]

## 2024-02-28 NOTE — DISCHARGE NOTE PROVIDER - CARE PROVIDER_API CALL
Tex Meredith)  Neurology  [address]  [place] [address]  Phone: [phone]  Fax: [phone]  Follow Up Time:    Tex Meredith)  Neurology  [address]  [place] [address]  Phone: [phone]  Fax: [phone]  Follow Up Time:     Calixto Slaughter  Cardiovascular Disease  [address], [address]  [place] [address]  Phone: [phone]  Fax: [phone]  Follow Up Time:     Kiran Griffith.  Internal Medicine  [address], [address]  [place] [address]  Phone: [phone]  Fax: [phone]  Follow Up Time:

## 2024-02-28 NOTE — DISCHARGE NOTE PROVIDER - NSDCCPCAREPLAN_GEN_ALL_CORE_FT
PRINCIPAL DISCHARGE DIAGNOSIS  Diagnosis: Syncope  Assessment and Plan of Treatment: Cardiac work up unremarkable. Follow up with Dr Slaughter.      SECONDARY DISCHARGE DIAGNOSES  Diagnosis: Gait difficulty  Assessment and Plan of Treatment: Follow up with neurology as outpatient.    Diagnosis: HTN (hypertension)  Assessment and Plan of Treatment: Continue triamterene/HCTZ, atenolol, candesartan. Follow up with Dr Slaughter.    Diagnosis: CAD (coronary artery disease)  Assessment and Plan of Treatment: Continue Aspirin and statin. Follow up with Dr Slaughter.     PRINCIPAL DISCHARGE DIAGNOSIS  Diagnosis: Syncope  Assessment and Plan of Treatment: Cardiac work up unremarkable. Follow up with Dr Slaughter.   echo    1. Left ventricular cavity is normal in size. Left ventricular wall thickness is normal. Left ventricular systolic function is normal with an ejection fraction of 65 % by Nance's method of disks.   2. The right ventricle is not well visualized. probably normal systolic function.   3. The left atrium is normal.   4. No significant valvular disease.   5. No pericardial effusion seen.   6. The inferior vena cava is normal in size measuring 1.27 cm in diameter, (normal <2.1cm) with normal inspiratory collapse (normal >50%) consistent with normal right atrial pressure (~3, range 0-5mmHg).      SECONDARY DISCHARGE DIAGNOSES  Diagnosis: Gait difficulty  Assessment and Plan of Treatment: Follow up with neurology as outpatient.  neuro rec- Can consider CT Cervical and lumbar spine, does not need to delay DC if otherwise ready to go  risks of prolonging length of stay to w/u chronic neurologic do not outweigh benefit    Diagnosis: HTN (hypertension)  Assessment and Plan of Treatment: Continue triamterene/HCTZ, atenolol, candesartan. Follow up with Dr Slaughter.    Diagnosis: CAD (coronary artery disease)  Assessment and Plan of Treatment: Continue Aspirin and statin. Follow up with Dr Slaughter.

## 2024-02-28 NOTE — CONSULT NOTE ADULT - ASSESSMENT
patient recently has had some cognitive difficulties and unsteadiness of gait.  He had an episode of falling with sudden loss of consciousness according to the wife.  His physical exam is unremarkable he has no orthostatic hypotension and his EKG is stable with a right bundle branch block.  I do not feel there is any acute cardiac issue or any arrhythmia to explain the episode that he had.  This could be somewhat neurologic in origin with unsteadiness of gait.  There does not appear to be any acute cardiac issue     recommend   1.  Neurology follow-up   2.  2D echo which will be expedited for today.  Pending echo discharge home   3.  Discharge home and follow-up with me in the office and he can follow-up with neurology also electively

## 2024-02-28 NOTE — DIETITIAN INITIAL EVALUATION ADULT - PERTINENT MEDS FT
MEDICATIONS  (STANDING):  allopurinol 200 milliGRAM(s) Oral daily  aspirin enteric coated 325 milliGRAM(s) Oral daily  atenolol  Tablet 50 milliGRAM(s) Oral at bedtime  atenolol  Tablet 25 milliGRAM(s) Oral <User Schedule>  atorvastatin 40 milliGRAM(s) Oral at bedtime  escitalopram 10 milliGRAM(s) Oral daily  heparin   Injectable 5000 Unit(s) SubCutaneous every 12 hours  losartan 100 milliGRAM(s) Oral daily  triamterene 37.5 mG/hydrochlorothiazide 25 mG Tablet 1 Tablet(s) Oral daily    MEDICATIONS  (PRN):  acetaminophen     Tablet .. 650 milliGRAM(s) Oral every 6 hours PRN Temp greater or equal to 38C (100.4F), Mild Pain (1 - 3)  baclofen 2.5 milliGRAM(s) Oral every 8 hours PRN hiccups, spasms  haloperidol    Injectable 2.5 milliGRAM(s) IntraMuscular every 6 hours PRN agitation  traZODone 50 milliGRAM(s) Oral at bedtime PRN insomnia

## 2024-02-28 NOTE — DIETITIAN INITIAL EVALUATION ADULT - ORAL INTAKE PTA/DIET HISTORY
Patient is sleeping during visit. Collateral obtained from wife via phone. Per wife, patient has poor appetite at home. However, denies patient has any recent weight changes. Patient follows a regular diet at home. Wife reports patient has no known food allergies.

## 2024-02-28 NOTE — PHYSICAL THERAPY INITIAL EVALUATION ADULT - GENERAL OBSERVATIONS, REHAB EVAL
Chart reviewed and cleared for PT by MANUEL Anne. Pt received in bathroom in room in NAD, all lines intact + telemetry, HR 66, pts wife and nursing staff at bedside.

## 2024-02-28 NOTE — DIETITIAN INITIAL EVALUATION ADULT - ADD RECOMMEND
1. Nutrition department to provide Oragin 11oz 1x/day (220kcal, 16gm pro) for nutrient support.   2. Monitor weight, labs, po intake and tolerance, bowel movement, skin integrity.   3. Encourage PO intake and honor food preferences as able.

## 2024-02-28 NOTE — DISCHARGE NOTE PROVIDER - ATTENDING DISCHARGE PHYSICAL EXAMINATION:
Constitutional: NAD, awake and alert, well developed  EYES: EOMI, conjunctiva clear  ENT:  Normal Hearing, no tonsillar exudates   Neck: Soft and supple , no thyromegaly   Respiratory: mild crackles at bases, No wheezing or rhonchi, no tachypnea, no accessory muscle use  Cardiovascular: S1 and S2, regular rate and rhythm, no Murmurs, gallops or rubs, no JVD, no leg edema  Gastrointestinal: Bowel Sounds present, soft, nontender, nondistended, no guarding, no rebound  Extremities: No cyanosis or clubbing; warm to touch  Neurological: Oriented x4, No focal deficits, CN II-XII intact bilaterally, sensation to light touch intact in all extremities. No cogwheel rigidity  Musculoskeletal: 5/5 strength b/l upper and lower extremities; no joint swelling.  Skin: No rashes, no ulcerations

## 2024-02-28 NOTE — CONSULT NOTE ADULT - SUBJECTIVE AND OBJECTIVE BOX
Patient was admitted with a near syncopal or syncopal episode.  Patient was sent to the emergency room by his internist who asked me to consult.  The patient is well-known to me  HPI:  78M with PMHx HTN, CAD s/p stent ~30 years ago, CKD?, MCI?, chronic urinary incontinence, tubular adenoma presenting with possible syncope at home. Collateral obtained by wife at bedside Esther and son Kaz. The patient has had months of difficulty ambulating and gait shuffling. He also has been having waxing and waning confusion with periods of agitation. He often times is awake all night and periodically takes ambien PRN for sleep. Two weeks ago he had a fall in the bathroom onto his L shoulder but did not seek medical attention. Last night he was at the top of the stairs when he collapsed. Per wife he was unresponsive for a seconds to minutes. It took him a few minutes to get back to mental status baseline and get back up standing. The patient himself denies ever passing out but is unable to recall all events. Per wife he did not have any tonic/clonic jerking, urinary/bowel incontinence, tongue biting or hx seizures. Family denies recent fevers, chills, cough, CP, SOB, palpitations, LH, dizziness, abdominal pain, vomiting, diarrhea, dysuria. Patient has hx of stent ~30 years ago but no known hx CHF. (27 Feb 2024 21:23)     patient presently is without symptoms and is very anxious to go home.  He has no orthostatic hypotension and had no evidence on CT of an acute neurologic event   he presently denies dizziness confusion shortness of breath.    EKG is unchanged normal sinus rhythm right bundle branch block    MEDICATIONS:  MEDICATIONS  (STANDING):  allopurinol 200 milliGRAM(s) Oral daily  aspirin enteric coated 325 milliGRAM(s) Oral daily  atenolol  Tablet 50 milliGRAM(s) Oral at bedtime  atenolol  Tablet 25 milliGRAM(s) Oral <User Schedule>  atorvastatin 40 milliGRAM(s) Oral at bedtime  escitalopram 10 milliGRAM(s) Oral daily  heparin   Injectable 5000 Unit(s) SubCutaneous every 12 hours  losartan 100 milliGRAM(s) Oral daily  triamterene 37.5 mG/hydrochlorothiazide 25 mG Tablet 1 Tablet(s) Oral daily      PHYSICAL EXAM:  T(C): 36.8 (02-28-24 @ 09:30), Max: 36.8 (02-28-24 @ 01:47)  HR: 65 (02-28-24 @ 09:30) (60 - 65)  BP: 152/86 (02-28-24 @ 09:30) (136/64 - 166/92)  RR: 18 (02-28-24 @ 09:30) (15 - 18)  SpO2: 96% (02-28-24 @ 09:30) (94% - 97%)  Wt(kg): --  I&O's Summary    Height (cm): 179.1 (02-28 @ 07:25)  Weight (kg): 82.1 (02-28 @ 07:25)  BMI (kg/m2): 25.6 (02-28 @ 07:25)  BSA (m2): 2.01 (02-28 @ 07:25)    Appearance: Normal	 awake alert oriented no acute distress wants to go home no orthostatic hypotension  HEENT:   Normal oral mucosa, PERRL, EOMI	  Cardiovascular: Normal S1 S2, No JVD, No murmurs ,  Respiratory: Lungs clear to auscultation, normal effort 	  Gastrointestinal:  Soft, Non-tender, + BS	  Skin: No rashes, No ecchymoses, No cyanosis, warm to touch  Musculoskeletal: Normal range of motion, normal strength  Psychiatry:  Mood & affect appropriate  Ext: No edema        LABS:    CARDIAC MARKERS:  CARDIAC MARKERS ( 28 Feb 2024 06:45 )  x     / x     / 209 U/L / x     / x      CARDIAC MARKERS ( 27 Feb 2024 15:00 )  x     / x     / 110 U/L / x     / x                                    15.5   9.02  )-----------( 116      ( 28 Feb 2024 06:45 )             45.0     02-28    137  |  102  |  19  ----------------------------<  98  3.6   |  22  |  1.12    Ca    9.4      28 Feb 2024 06:45  Phos  3.1     02-27  Mg     2.10     02-27    TPro  7.2  /  Alb  3.8  /  TBili  0.5  /  DBili  x   /  AST  26  /  ALT  18  /  AlkPhos  67  02-28    proBNP:   Lipid Profile:   HgA1c:   TSH: Thyroid Stimulating Hormone, Serum: 2.23 uIU/mL (02-28 @ 06:45)            TELEMETRY: 	    ECG:  	 normal sinus rhythm right bundle branch block  RADIOLOGY:   DIAGNOSTIC TESTING:  [t< from: CT Head No Cont (02.27.24 @ 16:32) >  FINDINGS: There is no acute intracranial hemorrhage, mass effect, shift   of the midline structures, herniation, extra-axial fluid collection, or   hydrocephalus.    There is diffuse cerebral volume loss with prominence of the sulci,   fissures, and cisternal spaces which is normal for the patient's age.   There is minimal deep and periventricular white matter hypoattenuation   statistically compatible with microvascular changes given calcific   atherosclerotic disease of the intracranial arteries.    A small amount of aerated secretions are seen within the left maxillary   sinus. Additional mild scattered code thickening is seen throughout the   ethmoid complex. The tympanomastoid cavities are clear. The calvarium is   intact. The imaged orbits are unremarkable.    IMPRESSION: No acute intracranial hemorrhage, mass effect, or shift of   the midline structures.    --- End of Report ---            CARSON TORRES MD; Attending Radiologist  This document has been electronically signed. Feb 27 2024  4:39PM            
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 Neurology consulted per family request for concern for gait changes and concern for cognitive decline.    Spoke with patient's wife over the phone. Patient's wife reports patient was about to go down the stairs, fell down before he reached the stairs, did not land on his head. Reports he was "unable to respond" to her for some time, described as not being able to help in getting himself off the ground, lasting about 5-7 minutes per wife. She states she was trying to direct him to help her help him, but he wasn't able to follow what she was asking him to do. She states he could not control his body or legs or arms. She denied any seizure-like convulsive activity. Patient did not urinate himself. Wife states this is not the first time he had a fall. He fell once before, injured his left arm. Wife states patient's gait is shuffling and slow. He merissa when he walks and has to hold on to the wall. He does not use a cane or a walker. She is unable to tell if he has freezing of his gait. Wife reports patient's balance has been off lately, especially over last 3 months. He has been stumbling more often. He does not have constipation but does have times where he is  unable to reach the bathroom in time and has urinary incontinence. No episodes of waking up with the bed wet. Per wife, patient lately has not had great memory, forgets names. She is unable to describe memory issues further, but does mention that patient sometimes has "no filter" when he talks, reports patient can be disinhibited or impulsive. Wife does the shopping, cooking, bills for the past year. Patient is using the bathroom on his own, but is having difficulty with showering and with wiping, especially after the injury to his L arm. Per wife, patient has been gradually, slowly declining in mentation and function over the last year. He follows with a PCP but refuses to follow with a neurologist. Patient tries to hide his symptoms from his wife and minimizes any complaints in front of her.     Patient seen at bedside by neurology. Patient endorses unsteadiness while walking, ongoing for last year or so. He states he had a fall while attempting to go down the stairs today. He denies injury to the head. He states this is not the first time episode and he has had prior falls, once with injury to the left shoulder. He denies prolonged confusion after his falls. No lightheadedness, dizziness, hot flashes, palpitations, headache, leg pain, focal leg weakness prior to the fall. Patient reports he remembers the fall, denies LOC.   He denies drinking alcohol and endorses occasional cigar use socially.   He endorses hx of cardiac procedure in past, but denies prior surgical hx.  Patient is on allopurinol for high uric acid, anti-hypertensives, and cholesterol medication.   Denies allergies. No recent travel. FH of diabetes.     Review of Systems:    CONSTITUTIONAL: No new fevers or chills  EYES AND ENT: No visual changes or no throat pain   NECK: No pain or stiffness  RESPIRATORY: No hemoptysis or shortness of breath  CARDIOVASCULAR: No chest pain or palpitations  GASTROINTESTINAL: No melena or hematochezia  GENITOURINARY: No dysuria or hematuria  NEUROLOGICAL: +As stated in HPI above  SKIN: No itching, burning, rashes, or lesions      Allergies:  Allergy Status Unknown      PMHx/PSHx/Family Hx: As above, otherwise see below   Hypertension  Hyperlipemia  CAD (coronary artery disease)  Mild cognitive impairment    Social Hx:  No current use of alcohol; endorses occasional cigar use  retired physician - IM    Medications:  MEDICATIONS  (STANDING):  allopurinol 200 milliGRAM(s) Oral daily  aspirin enteric coated 325 milliGRAM(s) Oral daily  atenolol  Tablet 50 milliGRAM(s) Oral at bedtime  atenolol  Tablet 25 milliGRAM(s) Oral <User Schedule>  atorvastatin 40 milliGRAM(s) Oral at bedtime  escitalopram 10 milliGRAM(s) Oral daily  heparin   Injectable 5000 Unit(s) SubCutaneous every 12 hours    MEDICATIONS  (PRN):  acetaminophen     Tablet .. 650 milliGRAM(s) Oral every 6 hours PRN Temp greater or equal to 38C (100.4F), Mild Pain (1 - 3)  baclofen 2.5 milliGRAM(s) Oral every 8 hours PRN hiccups, spasms  haloperidol    Injectable 2.5 milliGRAM(s) IntraMuscular every 6 hours PRN agitation  traZODone 50 milliGRAM(s) Oral at bedtime PRN insomnia      Vitals:  T(C): 36.6 (02-27-24 @ 21:24), Max: 36.6 (02-27-24 @ 13:39)  HR: 64 (02-27-24 @ 21:24) (62 - 67)  BP: 166/92 (02-27-24 @ 21:24) (122/51 - 167/83)  RR: 18 (02-27-24 @ 21:24) (15 - 22)  SpO2: 96% (02-27-24 @ 21:24) (94% - 97%)    Physical Examination:   General - NAD    Neurologic Exam:  Mental status - Awake, Alert, Oriented to person, place, and date. hard of hearing. Speech fluent, repetition and naming intact. Follows simple commands     Cranial nerves - PERRLA, VFF, EOMI, face sensation (V1-V3) intact b/l, facial strength intact without asymmetry b/l, hearing impaired b/l baseline palate with symmetric elevation, trapezius 5/5 strength b/l, tongue midline on protrusion with full lateral movement    MOCA 25/29 (did not test list of letters due to difficulty hearing)    Motor - Normal bulk and tone throughout. No cogwheel rigidity. Symmetric subtle pronator drift.    Strength testing            Deltoid      Biceps      Triceps     Wrist Extension    Wrist Flexion     Interossei         R            5                 5               5                     5                              5                        5                 5  L             5                 5               5                     5                              5                        5                 5              Hip Flexion    Hip Extension    Knee Flexion    Knee Extension    Dorsiflexion    Plantar Flexion  R              5                           5                       5                           5                            5                          5  L              5                           5                        5                           5                            5                          5    Sensation - Light touch  intact throughout    DTR's -             Biceps      Triceps     Brachioradialis      Patellar    Ankle    Toes/plantar response  R             2+             2+                  2+            2+            2+                 Down  L              2+             2+                 2+            2+            2+                 Down    Coordination - Finger to Nose intact b/l. No tremors appreciated    Gait and station - deferred till AM per patient request    Labs:                        15.7   7.48  )-----------( 124      ( 27 Feb 2024 14:30 )             46.3     02-27    136  |  99  |  23  ----------------------------<  111<H>  4.0   |  25  |  1.49<H>    Ca    9.8      27 Feb 2024 14:30  Phos  3.1     02-27  Mg     2.10     02-27    TPro  7.5  /  Alb  4.2  /  TBili  0.4  /  DBili  x   /  AST  23  /  ALT  19  /  AlkPhos  74  02-27    CAPILLARY BLOOD GLUCOSE        LIVER FUNCTIONS - ( 27 Feb 2024 14:30 )  Alb: 4.2 g/dL / Pro: 7.5 g/dL / ALK PHOS: 74 U/L / ALT: 19 U/L / AST: 23 U/L / GGT: x             PT/INR - ( 27 Feb 2024 14:30 )   PT: 11.6 sec;   INR: 1.03 ratio         PTT - ( 27 Feb 2024 14:30 )  PTT:29.5 sec  CSF:       Radiology:  CT Head No Cont:  (27 Feb 2024 16:32)    CT BRAIN    02/27/2024      FINDINGS: There is no acute intracranial hemorrhage, mass effect, shift of the midline structures, herniation, extra-axial fluid collection, or hydrocephalus.  There is diffuse cerebral volume loss with prominence of the sulci, fissures, and cisternal spaces which is normal for the patient's age. There is minimal deep and periventricular white matter hypoattenuation statistically compatible with microvascular changes given calcific atherosclerotic disease of the intracranial arteries.    A small amount of aerated secretions are seen within the left maxillary sinus. Additional mild scattered code thickening is seen throughout the ethmoid complex. The tympanomastoid cavities are clear. The calvarium is intact. The imaged orbits are unremarkable.    IMPRESSION: No acute intracranial hemorrhage, mass effect, or shift of the midline structures.    --- End of Report ---

## 2024-02-28 NOTE — DISCHARGE NOTE PROVIDER - HOSPITAL COURSE
78M with PMHx HTN, CAD s/p stent ~30 years ago, CKD?, MCI?, chronic urinary incontinence, tubular adenoma presenting with possible syncope at home.    ·  Problem: Syncope.   ·  Plan: Possible syncope given wife's report of unresponsiveness. Patient denies syncope. No reported seizure-like activity  -troponin 25--22, EKG NSR RBBB (old), no ST changes. Not ACS  -CTH negative for acute findings  -orthostatics neg  -TTE     ·  Problem: Gait difficulty.   ·  Plan: Gait difficulty with mild cognitive impairment. Possibly early signs of dementia. Has had multiple falls and patient not following up with neurology as outpatient.  Last MRI brain 5/2023 with chronic microvascular changes and impression: No evidence for intracranial mass, acute territorial infarct, acute intracranial hemorrhage, or midline shift.  CTH on admission: There is diffuse cerebral volume loss with prominence of the sulci, fissures, and cisternal spaces which is normal for the patient's age. There is minimal deep and periventricular white matter hypoattenuation compatible with microvascular change  -B12, folate, ammonia wnl   -CT cervical and lumbar spine can be done as outpatient   -outpatient neuro f/up   -PT eval    ·  Problem: Abnormal CXR.   ·  Plan: Mild pulmonary edema on CXR.   Probnp 335 and not significantly elevated  -echo ordered.    ·  Problem: HTN (hypertension).   ·  Plan: Resume triamterene/HCTZ, atenolol, candesartan     ·  Problem: HLD (hyperlipidemia).   ·  Plan: Continue statin     ·  Problem: CAD (coronary artery disease).   ·  Plan: Continue ASA, statin, BB.    ·  Problem: Elevated serum creatinine.   ·  Plan: Cr 1.4 similar to prior 2012 in system. Family denies hx of CKD.   -s/p IVF  -Cr improved to 1.1     ·  Problem: Tubular adenoma.   ·  Plan: Hx tubular adenoma on 3/2021 C-scope  -patient will be due for repeat C-scope as outpatient with GI for surveillance.   78M with PMHx HTN, CAD s/p stent ~30 years ago, CKD?, MCI?, chronic urinary incontinence, tubular adenoma presenting with possible syncope at home.    ·  Problem: Syncope.   ·  Plan: Possible syncope given wife's report of unresponsiveness. Patient denies syncope. No reported seizure-like activity  -troponin 25--22, EKG NSR RBBB (old), no ST changes. Not ACS  -CTH negative for acute findings  -orthostatics neg  -TTE -     1. Left ventricular cavity is normal in size. Left ventricular wall thickness is normal. Left ventricular systolic function is normal with an ejection fraction of 65 % by Nance's method of disks.   2. The right ventricle is not well visualized. probably normal systolic function.   3. The left atrium is normal.   4. No significant valvular disease.   5. No pericardial effusion seen.   6. The inferior vena cava is normal in size measuring 1.27 cm in diameter, (normal <2.1cm) with normal inspiratory collapse (normal >50%) consistent with normal right atrial pressure (~3, range 0-5mmHg).    ·  Problem: Gait difficulty.   ·  Plan: Gait difficulty with mild cognitive impairment. Possibly early signs of dementia. Has had multiple falls and patient not following up with neurology as outpatient.  Last MRI brain 5/2023 with chronic microvascular changes and impression: No evidence for intracranial mass, acute territorial infarct, acute intracranial hemorrhage, or midline shift.  CTH on admission: There is diffuse cerebral volume loss with prominence of the sulci, fissures, and cisternal spaces which is normal for the patient's age. There is minimal deep and periventricular white matter hypoattenuation compatible with microvascular change  -B12, folate, ammonia wnl   -CT cervical and lumbar spine can be done as outpatient   -outpatient neuro f/up   -PT eval    ·  Problem: Abnormal CXR.   ·  Plan: Mild pulmonary edema on CXR.   Probnp 335 and not significantly elevated  -echo ordered.    ·  Problem: HTN (hypertension).   ·  Plan: Resume triamterene/HCTZ, atenolol, candesartan     ·  Problem: HLD (hyperlipidemia).   ·  Plan: Continue statin     ·  Problem: CAD (coronary artery disease).   ·  Plan: Continue ASA, statin, BB.    ·  Problem: Elevated serum creatinine.   ·  Plan: Cr 1.4 similar to prior 2012 in system. Family denies hx of CKD.   -s/p IVF  -Cr improved to 1.1     ·  Problem: Tubular adenoma.   ·  Plan: Hx tubular adenoma on 3/2021 C-scope  -patient will be due for repeat C-scope as outpatient with GI for surveillance.   78M with PMHx HTN, CAD s/p stent ~30 years ago, CKD?, MCI?, chronic urinary incontinence, tubular adenoma presenting with possible syncope at home.    ·  Problem: Syncope.   ·  Plan: Possible syncope given wife's report of unresponsiveness. Patient denies syncope. No reported seizure-like activity  -troponin 25--22, EKG NSR RBBB (old), no ST changes. Not ACS  -CTH negative for acute findings  -orthostatics neg  -TTE w/ normal LV systolic function (EF 65 %), normal RV systolic function. No significant valvular disease.    ·  Problem: Gait difficulty.   ·  Plan: Gait difficulty with mild cognitive impairment. Possibly early signs of dementia. Has had multiple falls and patient not following up with neurology as outpatient.  Last MRI brain 5/2023 with chronic microvascular changes and impression: No evidence for intracranial mass, acute territorial infarct, acute intracranial hemorrhage, or midline shift.  CTH on admission: There is diffuse cerebral volume loss with prominence of the sulci, fissures, and cisternal spaces which is normal for the patient's age. There is minimal deep and periventricular white matter hypoattenuation compatible with microvascular change  -B12, folate, ammonia wnl   -CT cervical and lumbar spine can be done as outpatient   -outpatient neuro f/up   -PT: home PT     ·  Problem: Abnormal CXR.   ·  Plan: Mild pulmonary edema on CXR.   Probnp 335 and not significantly elevated  -echo unremarkable     ·  Problem: HTN (hypertension).   ·  Plan: Resume triamterene/HCTZ, atenolol, candesartan     ·  Problem: HLD (hyperlipidemia).   ·  Plan: Continue statin     ·  Problem: CAD (coronary artery disease).   ·  Plan: Continue ASA, statin, BB.    ·  Problem: Elevated serum creatinine.   ·  Plan: Cr 1.4 similar to prior 2012 in system. Family denies hx of CKD.   -s/p IVF  -Cr improved to 1.1     ·  Problem: Tubular adenoma.   ·  Plan: Hx tubular adenoma on 3/2021 C-scope  -patient will be due for repeat C-scope as outpatient with GI for surveillance.

## 2024-02-28 NOTE — DISCHARGE NOTE NURSING/CASE MANAGEMENT/SOCIAL WORK - NSDCPEFALRISK_GEN_ALL_CORE
For information on Fall & Injury Prevention, visit: [email or web address] OR  [email or web address] OR  https://www.cdc.gov/steadi/patient.html

## 2024-02-28 NOTE — SBIRT NOTE ADULT - NSSBIRTDRGPOSREINDET_GEN_A_CORE
Provided positive reinforcement as pt denies use of all drugs except those prescribed for medical purposes.

## 2024-02-28 NOTE — DISCHARGE NOTE PROVIDER - NSDCMRMEDTOKEN_GEN_ALL_CORE_FT
allopurinol 100 mg oral tablet: 2 tab(s) orally once a day  atenolol 25 mg oral tablet: 1 tab(s) orally once a day  atenolol 50 mg oral tablet: 1 tab(s) orally once a day (at bedtime)  baclofen 10 mg oral tablet: 1 tab(s) orally once a day (at bedtime) as needed for  muscle spasm  candesartan 32 mg oral tablet: 1 tab(s) orally once a day  Ecotrin 325 mg oral delayed release tablet: 1 tab(s) orally once a day  escitalopram 10 mg oral tablet: 1 tab(s) orally once a day  rosuvastatin 10 mg oral tablet: 1 tab(s) orally once a day (at bedtime)  triamterene-hydrochlorothiazide 37.5 mg-25 mg oral capsule: 1 cap(s) orally once a day  zolpidem 5 mg oral tablet: 1 tab(s) orally once a day (at bedtime) as needed for  insomnia

## 2024-02-28 NOTE — DIETITIAN INITIAL EVALUATION ADULT - PERTINENT LABORATORY DATA
02-28    137  |  102  |  19  ----------------------------<  98  3.6   |  22  |  1.12    Ca    9.4      28 Feb 2024 06:45  Phos  3.1     02-27  Mg     2.10     02-27    TPro  7.2  /  Alb  3.8  /  TBili  0.5  /  DBili  x   /  AST  26  /  ALT  18  /  AlkPhos  67  02-28  A1C with Estimated Average Glucose Result: 5.9 % (02-28-24 @ 06:45)

## 2024-02-28 NOTE — PHYSICAL THERAPY INITIAL EVALUATION ADULT - PERTINENT HX OF CURRENT PROBLEM, REHAB EVAL
Pt is a 78 year old male with a past medical history of HTN, CAD s/p stent ~30 years ago, CKD?, MCI?, chronic urinary incontinence, tubular adenoma presenting with possible syncope at home. Collateral obtained by wife at bedside Esther and son Kaz. The patient has had months of difficulty ambulating and gait shuffling. He also has been having waxing and waning confusion with periods of agitation. He often times is awake all night and periodically takes ambien PRN for sleep. Two weeks ago he had a fall in the bathroom onto his L shoulder but did not seek medical attention. Last night he was at the top of the stairs when he collapsed. Per wife he was unresponsive for a seconds to minutes. It took him a few minutes to get back to mental status baseline and get back up standing. The patient himself denies ever passing out but is unable to recall all events. Per wife he did not have any tonic/clonic jerking, urinary/bowel incontinence, tongue biting or hx seizures. Family denies recent fevers, chills, cough, CP, SOB, palpitations, LH, dizziness, abdominal pain, vomiting, diarrhea, dysuria. Patient has hx of stent ~30 years ago but no known hx CHF.

## 2024-03-04 LAB — VIT B1 SERPL-MCNC: 118.8 NMOL/L — SIGNIFICANT CHANGE UP (ref 66.5–200)

## 2024-03-05 LAB — PYRIDOXAL PHOS SERPL-MCNC: 5.1 UG/L — SIGNIFICANT CHANGE UP (ref 3.4–65.2)

## 2024-03-13 PROBLEM — G31.84 MILD COGNITIVE IMPAIRMENT OF UNCERTAIN OR UNKNOWN ETIOLOGY: Chronic | Status: ACTIVE | Noted: 2024-02-27

## 2024-03-13 PROBLEM — I25.10 ATHEROSCLEROTIC HEART DISEASE OF NATIVE CORONARY ARTERY WITHOUT ANGINA PECTORIS: Chronic | Status: ACTIVE | Noted: 2024-02-27

## 2024-04-03 ENCOUNTER — APPOINTMENT (OUTPATIENT)
Dept: INTERNAL MEDICINE | Facility: CLINIC | Age: 79
End: 2024-04-03
Payer: MEDICARE

## 2024-04-03 VITALS
WEIGHT: 184 LBS | BODY MASS INDEX: 26.05 KG/M2 | DIASTOLIC BLOOD PRESSURE: 72 MMHG | HEIGHT: 70.5 IN | SYSTOLIC BLOOD PRESSURE: 130 MMHG

## 2024-04-03 DIAGNOSIS — I10 ESSENTIAL (PRIMARY) HYPERTENSION: ICD-10-CM

## 2024-04-03 DIAGNOSIS — E78.00 PURE HYPERCHOLESTEROLEMIA, UNSPECIFIED: ICD-10-CM

## 2024-04-03 DIAGNOSIS — I25.10 ATHEROSCLEROTIC HEART DISEASE OF NATIVE CORONARY ARTERY W/OUT ANGINA PECTORIS: ICD-10-CM
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
## 2024-04-03 PROCEDURE — 99213 OFFICE O/P EST LOW 20 MIN: CPT

## 2024-04-03 PROCEDURE — G2211 COMPLEX E/M VISIT ADD ON: CPT

## 2024-04-03 NOTE — ASSESSMENT
[FreeTextEntry1] : Patient remained stable.  He is somewhat paranoid and belligerent.  Difficult to get history from the wife but it appears that he is falling less.  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

## 2024-04-03 NOTE — HISTORY OF PRESENT ILLNESS
[FreeTextEntry1] : This is an 79-year-old male for evaluation and treatment of his hypertension hypercholesterolemia ASHD mild cognitive impairment possible parkinsonian and recent episode of syncope [de-identified] : Patient comes in accompanied by his wife.  He remains argumentative and animated.  He does have bradykinesia and there is certainly some memory deficits  He states he feels fine complains about his hospitalization and denies syncope

## 2024-07-09 ENCOUNTER — LABORATORY RESULT (OUTPATIENT)
Age: 79
End: 2024-07-09

## 2024-07-09 ENCOUNTER — APPOINTMENT (OUTPATIENT)
Dept: INTERNAL MEDICINE | Facility: CLINIC | Age: 79
End: 2024-07-09
Payer: MEDICARE

## 2024-07-09 ENCOUNTER — NON-APPOINTMENT (OUTPATIENT)
Age: 79
End: 2024-07-09

## 2024-07-09 VITALS — SYSTOLIC BLOOD PRESSURE: 90 MMHG | DIASTOLIC BLOOD PRESSURE: 70 MMHG

## 2024-07-09 DIAGNOSIS — F32.A DEPRESSION, UNSPECIFIED: ICD-10-CM

## 2024-07-09 DIAGNOSIS — A04.72 ENTEROCOLITIS DUE TO CLOSTRIDIUM DIFFICILE, NOT SPECIFIED AS RECURRENT: ICD-10-CM

## 2024-07-09 PROCEDURE — 93000 ELECTROCARDIOGRAM COMPLETE: CPT

## 2024-07-09 PROCEDURE — 99214 OFFICE O/P EST MOD 30 MIN: CPT

## 2024-07-09 PROCEDURE — 81003 URINALYSIS AUTO W/O SCOPE: CPT | Mod: QW

## 2024-07-09 PROCEDURE — 36415 COLL VENOUS BLD VENIPUNCTURE: CPT

## 2024-07-10 LAB
25(OH)D3 SERPL-MCNC: 45 NG/ML
ALBUMIN SERPL ELPH-MCNC: 4.4 G/DL
ALP BLD-CCNC: 72 U/L
ALT SERPL-CCNC: 14 U/L
ANION GAP SERPL CALC-SCNC: 12 MMOL/L
AST SERPL-CCNC: 21 U/L
BASOPHILS # BLD AUTO: 0.07 K/UL
BASOPHILS NFR BLD AUTO: 0.8 %
BILIRUB SERPL-MCNC: 0.6 MG/DL
CALCIUM SERPL-MCNC: 9.8 MG/DL
CHLORIDE SERPL-SCNC: 100 MMOL/L
CHOLEST SERPL-MCNC: 113 MG/DL
CK SERPL-CCNC: 83 U/L
CO2 SERPL-SCNC: 25 MMOL/L
CRP SERPL HS-MCNC: 0.46 MG/L
CRP SERPL-MCNC: <3 MG/L
EGFR: 49 ML/MIN/1.73M2
EOSINOPHIL # BLD AUTO: 0.26 K/UL
GGT SERPL-CCNC: 19 U/L
GLUCOSE BS SERPL-MCNC: 95 MG/DL
GLUCOSE SERPL-MCNC: 98 MG/DL
HBA1C MFR BLD HPLC: 6.1 %
HCT VFR BLD CALC: 44.1 %
HDLC SERPL-MCNC: 40 MG/DL
HGB BLD-MCNC: 15.1 G/DL
IMM GRANULOCYTES NFR BLD AUTO: 0.4 %
LDH SERPL-CCNC: 175 U/L
LDLC SERPL CALC-MCNC: 49 MG/DL
LYMPHOCYTES NFR BLD AUTO: 27.3 %
MAGNESIUM SERPL-MCNC: 2.1 MG/DL
MAN DIFF?: NORMAL
MCHC RBC-ENTMCNC: 33.6 PG
MCHC RBC-ENTMCNC: 34.2 GM/DL
MCV RBC AUTO: 98.2 FL
MONOCYTES # BLD AUTO: 0.77 K/UL
MONOCYTES NFR BLD AUTO: 8.5 %
NEUTROPHILS NFR BLD AUTO: 60.1 %
NONHDLC SERPL-MCNC: 72 MG/DL
PLATELET # BLD AUTO: 153 K/UL
POTASSIUM SERPL-SCNC: 3.9 MMOL/L
PROT SERPL-MCNC: 7 G/DL
PSA FREE FLD-MCNC: 49 %
PSA FREE SERPL-MCNC: 0.33 NG/ML
PSA SERPL-MCNC: 0.68 NG/ML
RBC # BLD: 4.49 M/UL
RBC # FLD: 15.2 %
T3RU NFR SERPL: 0.9 TBI
TRIGL SERPL-MCNC: 135 MG/DL
TSH SERPL-ACNC: 3.18 UIU/ML
URATE SERPL-MCNC: 6.1 MG/DL
VIT B12 SERPL-MCNC: 573 PG/ML

## 2024-11-05 ENCOUNTER — LABORATORY RESULT (OUTPATIENT)
Age: 79
End: 2024-11-05

## 2024-11-05 ENCOUNTER — APPOINTMENT (OUTPATIENT)
Dept: INTERNAL MEDICINE | Facility: CLINIC | Age: 79
End: 2024-11-05
Payer: MEDICARE

## 2024-11-05 ENCOUNTER — NON-APPOINTMENT (OUTPATIENT)
Age: 79
End: 2024-11-05

## 2024-11-05 VITALS
SYSTOLIC BLOOD PRESSURE: 120 MMHG | DIASTOLIC BLOOD PRESSURE: 70 MMHG | BODY MASS INDEX: 26.05 KG/M2 | WEIGHT: 184 LBS | HEIGHT: 70.5 IN

## 2024-11-05 DIAGNOSIS — M10.9 GOUT, UNSPECIFIED: ICD-10-CM

## 2024-11-05 DIAGNOSIS — Z23 ENCOUNTER FOR IMMUNIZATION: ICD-10-CM

## 2024-11-05 DIAGNOSIS — I25.10 ATHEROSCLEROTIC HEART DISEASE OF NATIVE CORONARY ARTERY W/OUT ANGINA PECTORIS: ICD-10-CM

## 2024-11-05 DIAGNOSIS — E78.00 PURE HYPERCHOLESTEROLEMIA, UNSPECIFIED: ICD-10-CM

## 2024-11-05 DIAGNOSIS — I10 ESSENTIAL (PRIMARY) HYPERTENSION: ICD-10-CM

## 2024-11-05 PROCEDURE — 93000 ELECTROCARDIOGRAM COMPLETE: CPT

## 2024-11-05 PROCEDURE — 36415 COLL VENOUS BLD VENIPUNCTURE: CPT

## 2024-11-05 PROCEDURE — 90662 IIV NO PRSV INCREASED AG IM: CPT

## 2024-11-05 PROCEDURE — 99214 OFFICE O/P EST MOD 30 MIN: CPT

## 2024-11-05 PROCEDURE — G2211 COMPLEX E/M VISIT ADD ON: CPT

## 2024-11-05 PROCEDURE — 81003 URINALYSIS AUTO W/O SCOPE: CPT | Mod: QW

## 2024-11-05 PROCEDURE — G0008: CPT

## 2024-11-06 LAB
25(OH)D3 SERPL-MCNC: 45.3 NG/ML
ALBUMIN SERPL ELPH-MCNC: 4.2 G/DL
ALP BLD-CCNC: 77 U/L
ALT SERPL-CCNC: 19 U/L
ANION GAP SERPL CALC-SCNC: 15 MMOL/L
AST SERPL-CCNC: 23 U/L
BASOPHILS # BLD AUTO: 0.06 K/UL
BASOPHILS NFR BLD AUTO: 0.7 %
BILIRUB SERPL-MCNC: 0.4 MG/DL
BUN SERPL-MCNC: 21 MG/DL
CALCIUM SERPL-MCNC: 10.2 MG/DL
CHLORIDE SERPL-SCNC: 100 MMOL/L
CHOLEST SERPL-MCNC: 114 MG/DL
CK SERPL-CCNC: 78 U/L
CO2 SERPL-SCNC: 25 MMOL/L
CREAT SERPL-MCNC: 1.31 MG/DL
CRP SERPL HS-MCNC: 0.38 MG/L
CRP SERPL-MCNC: <3 MG/L
EGFR: 55 ML/MIN/1.73M2
EOSINOPHIL # BLD AUTO: 0.31 K/UL
EOSINOPHIL NFR BLD AUTO: 3.6 %
ERYTHROCYTE [SEDIMENTATION RATE] IN BLOOD BY WESTERGREN METHOD: 40 MM/HR
ESTIMATED AVERAGE GLUCOSE: 123 MG/DL
GGT SERPL-CCNC: 21 U/L
GLUCOSE BS SERPL-MCNC: 84 MG/DL
GLUCOSE SERPL-MCNC: 90 MG/DL
HBA1C MFR BLD HPLC: 5.9 %
HCT VFR BLD CALC: 42.7 %
HDLC SERPL-MCNC: 47 MG/DL
HGB BLD-MCNC: 15.1 G/DL
IMM GRANULOCYTES NFR BLD AUTO: 0.3 %
LDH SERPL-CCNC: 155 U/L
LDLC SERPL CALC-MCNC: 47 MG/DL
LYMPHOCYTES # BLD AUTO: 2.25 K/UL
LYMPHOCYTES NFR BLD AUTO: 26.1 %
MAGNESIUM SERPL-MCNC: 2.1 MG/DL
MAN DIFF?: NORMAL
MCHC RBC-ENTMCNC: 34.6 PG
MCHC RBC-ENTMCNC: 35.4 G/DL
MCV RBC AUTO: 97.7 FL
MONOCYTES # BLD AUTO: 0.69 K/UL
MONOCYTES NFR BLD AUTO: 8 %
NEUTROPHILS # BLD AUTO: 5.28 K/UL
NEUTROPHILS NFR BLD AUTO: 61.3 %
NONHDLC SERPL-MCNC: 67 MG/DL
PLATELET # BLD AUTO: 155 K/UL
POTASSIUM SERPL-SCNC: 4.3 MMOL/L
PROT SERPL-MCNC: 7 G/DL
PSA FREE FLD-MCNC: 47 %
PSA FREE SERPL-MCNC: 0.27 NG/ML
PSA SERPL-MCNC: 0.58 NG/ML
RBC # BLD: 4.37 M/UL
RBC # FLD: 14.5 %
SODIUM SERPL-SCNC: 140 MMOL/L
T3RU NFR SERPL: 0.9 TBI
T4 SERPL-MCNC: 6.5 UG/DL
TRIGL SERPL-MCNC: 107 MG/DL
TSH SERPL-ACNC: 2.93 UIU/ML
URATE SERPL-MCNC: 5.8 MG/DL
VIT B12 SERPL-MCNC: 536 PG/ML
WBC # FLD AUTO: 8.62 K/UL

## 2025-01-21 ENCOUNTER — RX RENEWAL (OUTPATIENT)
Age: 80
End: 2025-01-21

## 2025-02-12 ENCOUNTER — LABORATORY RESULT (OUTPATIENT)
Age: 80
End: 2025-02-12

## 2025-02-12 ENCOUNTER — NON-APPOINTMENT (OUTPATIENT)
Age: 80
End: 2025-02-12

## 2025-02-12 ENCOUNTER — APPOINTMENT (OUTPATIENT)
Dept: INTERNAL MEDICINE | Facility: CLINIC | Age: 80
End: 2025-02-12
Payer: MEDICARE

## 2025-02-12 VITALS
SYSTOLIC BLOOD PRESSURE: 120 MMHG | DIASTOLIC BLOOD PRESSURE: 72 MMHG | WEIGHT: 181 LBS | BODY MASS INDEX: 25.62 KG/M2 | HEIGHT: 70.5 IN

## 2025-02-12 DIAGNOSIS — I25.10 ATHEROSCLEROTIC HEART DISEASE OF NATIVE CORONARY ARTERY W/OUT ANGINA PECTORIS: ICD-10-CM

## 2025-02-12 DIAGNOSIS — I10 ESSENTIAL (PRIMARY) HYPERTENSION: ICD-10-CM

## 2025-02-12 DIAGNOSIS — E78.00 PURE HYPERCHOLESTEROLEMIA, UNSPECIFIED: ICD-10-CM

## 2025-02-12 PROCEDURE — 99214 OFFICE O/P EST MOD 30 MIN: CPT

## 2025-02-12 PROCEDURE — G2211 COMPLEX E/M VISIT ADD ON: CPT

## 2025-02-12 PROCEDURE — 36415 COLL VENOUS BLD VENIPUNCTURE: CPT

## 2025-02-12 PROCEDURE — 93000 ELECTROCARDIOGRAM COMPLETE: CPT

## 2025-02-13 LAB
25(OH)D3 SERPL-MCNC: 41.5 NG/ML
ALBUMIN SERPL ELPH-MCNC: 4.4 G/DL
ALP BLD-CCNC: 72 U/L
ALT SERPL-CCNC: 14 U/L
ANION GAP SERPL CALC-SCNC: 11 MMOL/L
AST SERPL-CCNC: 23 U/L
BASOPHILS # BLD AUTO: 0.06 K/UL
BASOPHILS NFR BLD AUTO: 0.7 %
BILIRUB SERPL-MCNC: 0.5 MG/DL
BUN SERPL-MCNC: 23 MG/DL
CALCIUM SERPL-MCNC: 10 MG/DL
CHLORIDE SERPL-SCNC: 98 MMOL/L
CHOLEST SERPL-MCNC: 112 MG/DL
CK SERPL-CCNC: 108 U/L
CO2 SERPL-SCNC: 28 MMOL/L
CREAT SERPL-MCNC: 1.42 MG/DL
CRP SERPL HS-MCNC: 0.59 MG/L
CRP SERPL-MCNC: <3 MG/L
EGFR: 50 ML/MIN/1.73M2
EOSINOPHIL # BLD AUTO: 0.28 K/UL
EOSINOPHIL NFR BLD AUTO: 3.4 %
ESTIMATED AVERAGE GLUCOSE: 131 MG/DL
GGT SERPL-CCNC: 17 U/L
GLUCOSE BS SERPL-MCNC: 67 MG/DL
GLUCOSE SERPL-MCNC: 83 MG/DL
HBA1C MFR BLD HPLC: 6.2 %
HCT VFR BLD CALC: 43.3 %
HDLC SERPL-MCNC: 40 MG/DL
HGB BLD-MCNC: 15.2 G/DL
IMM GRANULOCYTES NFR BLD AUTO: 0.4 %
LDH SERPL-CCNC: 186 U/L
LDLC SERPL CALC-MCNC: 47 MG/DL
LYMPHOCYTES # BLD AUTO: 2.01 K/UL
LYMPHOCYTES NFR BLD AUTO: 24.1 %
MAGNESIUM SERPL-MCNC: 2.1 MG/DL
MAN DIFF?: NORMAL
MCHC RBC-ENTMCNC: 34.9 PG
MCHC RBC-ENTMCNC: 35.1 G/DL
MCV RBC AUTO: 99.5 FL
MONOCYTES # BLD AUTO: 0.75 K/UL
MONOCYTES NFR BLD AUTO: 9 %
NEUTROPHILS # BLD AUTO: 5.22 K/UL
NEUTROPHILS NFR BLD AUTO: 62.4 %
NONHDLC SERPL-MCNC: 72 MG/DL
PLATELET # BLD AUTO: 153 K/UL
POTASSIUM SERPL-SCNC: 4 MMOL/L
PROT SERPL-MCNC: 7.2 G/DL
PSA FREE FLD-MCNC: 48 %
PSA FREE SERPL-MCNC: 0.29 NG/ML
PSA SERPL-MCNC: 0.61 NG/ML
RBC # BLD: 4.35 M/UL
RBC # FLD: 14.3 %
SODIUM SERPL-SCNC: 138 MMOL/L
T3RU NFR SERPL: 0.8 TBI
T4 SERPL-MCNC: 6.9 UG/DL
TRIGL SERPL-MCNC: 146 MG/DL
TSH SERPL-ACNC: 2.62 UIU/ML
URATE SERPL-MCNC: 5.9 MG/DL
VIT B12 SERPL-MCNC: 512 PG/ML
WBC # FLD AUTO: 8.35 K/UL

## 2025-05-27 ENCOUNTER — LABORATORY RESULT (OUTPATIENT)
Age: 80
End: 2025-05-27

## 2025-05-27 ENCOUNTER — NON-APPOINTMENT (OUTPATIENT)
Age: 80
End: 2025-05-27

## 2025-05-27 ENCOUNTER — APPOINTMENT (OUTPATIENT)
Dept: INTERNAL MEDICINE | Facility: CLINIC | Age: 80
End: 2025-05-27
Payer: MEDICARE

## 2025-05-27 VITALS
HEIGHT: 70.5 IN | DIASTOLIC BLOOD PRESSURE: 82 MMHG | WEIGHT: 180 LBS | SYSTOLIC BLOOD PRESSURE: 128 MMHG | BODY MASS INDEX: 25.48 KG/M2

## 2025-05-27 LAB
BILIRUB UR QL STRIP: NORMAL
CLARITY UR: CLEAR
COLLECTION METHOD: NORMAL
GLUCOSE UR-MCNC: NORMAL
HCG UR QL: 0.2 EU/DL
HGB UR QL STRIP.AUTO: NORMAL
KETONES UR-MCNC: NORMAL
LEUKOCYTE ESTERASE UR QL STRIP: NORMAL
NITRITE UR QL STRIP: NORMAL
PH UR STRIP: 7.5
PROT UR STRIP-MCNC: NORMAL
SP GR UR STRIP: 1.02

## 2025-05-27 PROCEDURE — 93000 ELECTROCARDIOGRAM COMPLETE: CPT

## 2025-05-27 PROCEDURE — 36415 COLL VENOUS BLD VENIPUNCTURE: CPT

## 2025-05-27 PROCEDURE — 99214 OFFICE O/P EST MOD 30 MIN: CPT

## 2025-05-27 PROCEDURE — G2211 COMPLEX E/M VISIT ADD ON: CPT

## 2025-05-27 PROCEDURE — 81003 URINALYSIS AUTO W/O SCOPE: CPT | Mod: QW

## 2025-05-28 LAB
25(OH)D3 SERPL-MCNC: 39 NG/ML
ALBUMIN SERPL ELPH-MCNC: 4.3 G/DL
ALP BLD-CCNC: 74 U/L
ALT SERPL-CCNC: 16 U/L
ANION GAP SERPL CALC-SCNC: 14 MMOL/L
AST SERPL-CCNC: 28 U/L
BASOPHILS # BLD AUTO: 0.05 K/UL
BASOPHILS NFR BLD AUTO: 0.6 %
BILIRUB SERPL-MCNC: 0.6 MG/DL
BUN SERPL-MCNC: 25 MG/DL
CALCIUM SERPL-MCNC: 9.9 MG/DL
CHLORIDE SERPL-SCNC: 96 MMOL/L
CHOLEST SERPL-MCNC: 120 MG/DL
CK SERPL-CCNC: 133 U/L
CO2 SERPL-SCNC: 25 MMOL/L
CREAT SERPL-MCNC: 1.38 MG/DL
CRP SERPL HS-MCNC: 0.24 MG/L
CRP SERPL-MCNC: <3 MG/L
EGFRCR SERPLBLD CKD-EPI 2021: 51 ML/MIN/1.73M2
EOSINOPHIL # BLD AUTO: 0.22 K/UL
EOSINOPHIL NFR BLD AUTO: 2.7 %
ESTIMATED AVERAGE GLUCOSE: 128 MG/DL
GGT SERPL-CCNC: 18 U/L
GLUCOSE SERPL-MCNC: 101 MG/DL
GLUCOSE SERPL-MCNC: 101 MG/DL
HBA1C MFR BLD HPLC: 6.1 %
HCT VFR BLD CALC: 41.2 %
HDLC SERPL-MCNC: 41 MG/DL
HGB BLD-MCNC: 13.8 G/DL
IMM GRANULOCYTES NFR BLD AUTO: 0.4 %
LDH SERPL-CCNC: 161 U/L
LDLC SERPL-MCNC: 58 MG/DL
LYMPHOCYTES # BLD AUTO: 1.89 K/UL
LYMPHOCYTES NFR BLD AUTO: 23.3 %
MAGNESIUM SERPL-MCNC: 2.1 MG/DL
MAN DIFF?: NORMAL
MCHC RBC-ENTMCNC: 33.5 G/DL
MCHC RBC-ENTMCNC: 34.1 PG
MCV RBC AUTO: 101.7 FL
MONOCYTES # BLD AUTO: 0.65 K/UL
MONOCYTES NFR BLD AUTO: 8 %
NEUTROPHILS # BLD AUTO: 5.27 K/UL
NEUTROPHILS NFR BLD AUTO: 65 %
NONHDLC SERPL-MCNC: 79 MG/DL
PLATELET # BLD AUTO: 173 K/UL
POTASSIUM SERPL-SCNC: 3.9 MMOL/L
PROT SERPL-MCNC: 7.1 G/DL
PSA FREE FLD-MCNC: 54 %
PSA FREE SERPL-MCNC: 0.28 NG/ML
PSA SERPL-MCNC: 0.51 NG/ML
RBC # BLD: 4.05 M/UL
RBC # FLD: 14.5 %
SODIUM SERPL-SCNC: 135 MMOL/L
T3RU NFR SERPL: 0.8 TBI
T4 SERPL-MCNC: 7.1 UG/DL
TRIGL SERPL-MCNC: 117 MG/DL
TSH SERPL-ACNC: 2.18 UIU/ML
URATE SERPL-MCNC: 6.1 MG/DL
VIT B12 SERPL-MCNC: 537 PG/ML
WBC # FLD AUTO: 8.11 K/UL

## 2025-06-23 ENCOUNTER — APPOINTMENT (OUTPATIENT)
Dept: NEUROLOGY | Facility: CLINIC | Age: 80
End: 2025-06-23

## 2025-06-23 VITALS
BODY MASS INDEX: 24.77 KG/M2 | HEIGHT: 70.5 IN | WEIGHT: 175 LBS | SYSTOLIC BLOOD PRESSURE: 149 MMHG | HEART RATE: 70 BPM | DIASTOLIC BLOOD PRESSURE: 69 MMHG

## 2025-06-23 PROBLEM — R41.89 COGNITIVE DECLINE: Status: ACTIVE | Noted: 2025-06-23

## 2025-06-23 PROBLEM — G20.C ATYPICAL PARKINSONISM: Status: ACTIVE | Noted: 2025-06-23

## 2025-06-23 PROCEDURE — 99205 OFFICE O/P NEW HI 60 MIN: CPT

## 2025-06-23 RX ORDER — TRIAMTERENE AND HYDROCHLOROTHIAZIDE 37.5; 25 MG/1; MG/1
37.5-25 CAPSULE ORAL DAILY
Refills: 0 | Status: ACTIVE | COMMUNITY
Start: 2025-06-23

## 2025-06-23 RX ORDER — ASPIRIN/ACETAMINOPHEN/CAFFEINE 500-325-65
325 POWDER IN PACKET (EA) ORAL DAILY
Refills: 0 | Status: ACTIVE | COMMUNITY
Start: 2025-06-23

## 2025-06-23 RX ORDER — CARBIDOPA AND LEVODOPA 25; 100 MG/1; MG/1
25-100 TABLET, EXTENDED RELEASE ORAL
Qty: 60 | Refills: 2 | Status: ACTIVE | COMMUNITY
Start: 2025-06-23 | End: 1900-01-01

## 2025-07-03 LAB
ALBUMIN SERPL ELPH-MCNC: 4.3 G/DL
ALP BLD-CCNC: 85 U/L
ALT SERPL-CCNC: 14 U/L
ANION GAP SERPL CALC-SCNC: 15 MMOL/L
AST SERPL-CCNC: 21 U/L
BILIRUB SERPL-MCNC: 0.5 MG/DL
BUN SERPL-MCNC: 23 MG/DL
CALCIUM SERPL-MCNC: 9.6 MG/DL
CHLORIDE SERPL-SCNC: 97 MMOL/L
CO2 SERPL-SCNC: 23 MMOL/L
CREAT SERPL-MCNC: 1.36 MG/DL
EGFRCR SERPLBLD CKD-EPI 2021: 52 ML/MIN/1.73M2
FOLATE SERPL-MCNC: 16.9 NG/ML
GLUCOSE SERPL-MCNC: 106 MG/DL
HCYS SERPL-MCNC: 22.2 UMOL/L
POTASSIUM SERPL-SCNC: 3.9 MMOL/L
PROT SERPL-MCNC: 7 G/DL
SODIUM SERPL-SCNC: 136 MMOL/L
VIT B12 SERPL-MCNC: 529 PG/ML

## 2025-07-04 LAB
APTT BLD: 30 SEC
B BURGDOR AB SER-IMP: NEGATIVE
B BURGDOR IGG+IGM SER QL: 0.35 INDEX
INR PPP: 0.91 RATIO
PT BLD: 10.8 SEC
T PALLIDUM AB SER QL IA: NEGATIVE

## 2025-07-05 ENCOUNTER — OUTPATIENT (OUTPATIENT)
Dept: OUTPATIENT SERVICES | Facility: HOSPITAL | Age: 80
LOS: 1 days | End: 2025-07-05
Payer: MEDICARE

## 2025-07-05 ENCOUNTER — APPOINTMENT (OUTPATIENT)
Dept: MRI IMAGING | Facility: CLINIC | Age: 80
End: 2025-07-05

## 2025-07-05 DIAGNOSIS — G47.00 INSOMNIA, UNSPECIFIED: ICD-10-CM

## 2025-07-05 DIAGNOSIS — G20.C PARKINSONISM, UNSPECIFIED: ICD-10-CM

## 2025-07-05 DIAGNOSIS — Z98.890 OTHER SPECIFIED POSTPROCEDURAL STATES: Chronic | ICD-10-CM

## 2025-07-05 DIAGNOSIS — R41.89 OTHER SYMPTOMS AND SIGNS INVOLVING COGNITIVE FUNCTIONS AND AWARENESS: ICD-10-CM

## 2025-07-05 DIAGNOSIS — G31.84 MILD COGNITIVE IMPAIRMENT OF UNCERTAIN OR UNKNOWN ETIOLOGY: ICD-10-CM

## 2025-07-05 DIAGNOSIS — G47.33 OBSTRUCTIVE SLEEP APNEA (ADULT) (PEDIATRIC): ICD-10-CM

## 2025-07-05 PROCEDURE — 70551 MRI BRAIN STEM W/O DYE: CPT | Mod: 26

## 2025-07-05 PROCEDURE — 70551 MRI BRAIN STEM W/O DYE: CPT

## 2025-07-05 PROCEDURE — 76377 3D RENDER W/INTRP POSTPROCES: CPT | Mod: 26

## 2025-07-05 PROCEDURE — 76377 3D RENDER W/INTRP POSTPROCES: CPT

## 2025-08-26 ENCOUNTER — LABORATORY RESULT (OUTPATIENT)
Age: 80
End: 2025-08-26

## 2025-08-26 ENCOUNTER — APPOINTMENT (OUTPATIENT)
Dept: INTERNAL MEDICINE | Facility: CLINIC | Age: 80
End: 2025-08-26
Payer: MEDICARE

## 2025-08-26 VITALS
SYSTOLIC BLOOD PRESSURE: 130 MMHG | HEIGHT: 70.5 IN | DIASTOLIC BLOOD PRESSURE: 70 MMHG | BODY MASS INDEX: 25.62 KG/M2 | WEIGHT: 181 LBS

## 2025-08-26 DIAGNOSIS — I10 ESSENTIAL (PRIMARY) HYPERTENSION: ICD-10-CM

## 2025-08-26 DIAGNOSIS — G47.33 OBSTRUCTIVE SLEEP APNEA (ADULT) (PEDIATRIC): ICD-10-CM

## 2025-08-26 DIAGNOSIS — R41.89 OTHER SYMPTOMS AND SIGNS INVOLVING COGNITIVE FUNCTIONS AND AWARENESS: ICD-10-CM

## 2025-08-26 DIAGNOSIS — R26.89 OTHER ABNORMALITIES OF GAIT AND MOBILITY: ICD-10-CM

## 2025-08-26 DIAGNOSIS — M10.9 GOUT, UNSPECIFIED: ICD-10-CM

## 2025-08-26 DIAGNOSIS — E78.00 PURE HYPERCHOLESTEROLEMIA, UNSPECIFIED: ICD-10-CM

## 2025-08-26 DIAGNOSIS — G20.C PARKINSONISM, UNSPECIFIED: ICD-10-CM

## 2025-08-26 PROCEDURE — G2211 COMPLEX E/M VISIT ADD ON: CPT

## 2025-08-26 PROCEDURE — 99214 OFFICE O/P EST MOD 30 MIN: CPT

## 2025-08-26 PROCEDURE — 36415 COLL VENOUS BLD VENIPUNCTURE: CPT

## 2025-08-26 PROCEDURE — 93000 ELECTROCARDIOGRAM COMPLETE: CPT

## 2025-08-27 ENCOUNTER — TRANSCRIPTION ENCOUNTER (OUTPATIENT)
Age: 80
End: 2025-08-27

## 2025-08-27 LAB
25(OH)D3 SERPL-MCNC: 40.6 NG/ML
ALBUMIN SERPL ELPH-MCNC: 4.2 G/DL
ALP BLD-CCNC: 78 U/L
ALT SERPL-CCNC: 15 U/L
ANION GAP SERPL CALC-SCNC: 13 MMOL/L
AST SERPL-CCNC: 24 U/L
BASOPHILS # BLD AUTO: 0.05 K/UL
BASOPHILS NFR BLD AUTO: 0.6 %
BILIRUB SERPL-MCNC: 0.6 MG/DL
BUN SERPL-MCNC: 20 MG/DL
CALCIUM SERPL-MCNC: 9.9 MG/DL
CHLORIDE SERPL-SCNC: 97 MMOL/L
CHOLEST SERPL-MCNC: 113 MG/DL
CK SERPL-CCNC: 77 U/L
CO2 SERPL-SCNC: 25 MMOL/L
CREAT SERPL-MCNC: 1.19 MG/DL
CRP SERPL HS-MCNC: 0.43 MG/L
CRP SERPL-MCNC: <3 MG/L
EGFRCR SERPLBLD CKD-EPI 2021: 61 ML/MIN/1.73M2
EOSINOPHIL # BLD AUTO: 0.18 K/UL
EOSINOPHIL NFR BLD AUTO: 2.1 %
ESTIMATED AVERAGE GLUCOSE: 128 MG/DL
GGT SERPL-CCNC: 17 U/L
GLUCOSE SERPL-MCNC: 77 MG/DL
GLUCOSE SERPL-MCNC: 86 MG/DL
HBA1C MFR BLD HPLC: 6.1 %
HCT VFR BLD CALC: 39.6 %
HDLC SERPL-MCNC: 40 MG/DL
HGB BLD-MCNC: 14.1 G/DL
IMM GRANULOCYTES NFR BLD AUTO: 0.5 %
LDH SERPL-CCNC: 150 U/L
LDLC SERPL-MCNC: 53 MG/DL
LYMPHOCYTES # BLD AUTO: 2.14 K/UL
LYMPHOCYTES NFR BLD AUTO: 24.9 %
MAGNESIUM SERPL-MCNC: 2 MG/DL
MAN DIFF?: NORMAL
MCHC RBC-ENTMCNC: 35.6 G/DL
MCHC RBC-ENTMCNC: 36.1 PG
MCV RBC AUTO: 101.3 FL
MONOCYTES # BLD AUTO: 0.77 K/UL
MONOCYTES NFR BLD AUTO: 9 %
NEUTROPHILS # BLD AUTO: 5.41 K/UL
NEUTROPHILS NFR BLD AUTO: 62.9 %
NONHDLC SERPL-MCNC: 73 MG/DL
PLATELET # BLD AUTO: 153 K/UL
POTASSIUM SERPL-SCNC: 3.7 MMOL/L
PROT SERPL-MCNC: 7.1 G/DL
PSA FREE FLD-MCNC: 50 %
PSA FREE SERPL-MCNC: 0.26 NG/ML
PSA SERPL-MCNC: 0.53 NG/ML
RBC # BLD: 3.91 M/UL
RBC # FLD: 15 %
SODIUM SERPL-SCNC: 135 MMOL/L
T3RU NFR SERPL: 0.8 TBI
T4 SERPL-MCNC: 6.4 UG/DL
TRIGL SERPL-MCNC: 106 MG/DL
TSH SERPL-ACNC: 2.96 UIU/ML
URATE SERPL-MCNC: 5.4 MG/DL
VIT B12 SERPL-MCNC: 502 PG/ML
WBC # FLD AUTO: 8.59 K/UL

## 2025-09-04 ENCOUNTER — APPOINTMENT (OUTPATIENT)
Dept: NEUROLOGY | Facility: CLINIC | Age: 80
End: 2025-09-04